# Patient Record
Sex: MALE | Race: WHITE | NOT HISPANIC OR LATINO | Employment: OTHER | URBAN - METROPOLITAN AREA
[De-identification: names, ages, dates, MRNs, and addresses within clinical notes are randomized per-mention and may not be internally consistent; named-entity substitution may affect disease eponyms.]

---

## 2017-08-21 ENCOUNTER — OFFICE VISIT (OUTPATIENT)
Dept: NEUROSURGERY | Facility: CLINIC | Age: 47
End: 2017-08-21

## 2017-08-21 VITALS
DIASTOLIC BLOOD PRESSURE: 68 MMHG | TEMPERATURE: 98.1 F | HEIGHT: 73 IN | SYSTOLIC BLOOD PRESSURE: 132 MMHG | WEIGHT: 227 LBS | BODY MASS INDEX: 30.09 KG/M2

## 2017-08-21 DIAGNOSIS — M25.551 RIGHT HIP PAIN: Primary | ICD-10-CM

## 2017-08-21 PROCEDURE — 99203 OFFICE O/P NEW LOW 30 MIN: CPT | Performed by: NEUROLOGICAL SURGERY

## 2017-08-21 NOTE — PROGRESS NOTES
Subjective     Chief Complaint: Right hip pain    Patient ID: Kong Murphy is a 46 y.o. male seen for consultation today at the request of  Laura Gambino MD    Back Pain   This is a chronic problem. The current episode started more than 1 month ago. The problem occurs every several days. The problem has been waxing and waning since onset. The pain is present in the gluteal. The pain is at a severity of 8/10. The pain is severe. The pain is worse during the night. The symptoms are aggravated by position, sitting and standing. Stiffness is present in the morning. Associated symptoms include leg pain. Pertinent negatives include no abdominal pain, bladder incontinence, bowel incontinence, chest pain, dysuria, fever, headaches, numbness, paresis, paresthesias, pelvic pain, perianal numbness, tingling, weakness or weight loss. He has tried NSAIDs, home exercises, heat, chiropractic manipulation and analgesics for the symptoms. The treatment provided mild relief.       This is a 46-year-old man who presents to my clinic with a chronic history of right hip and leg pain.  He denies back pain.  He has tried physical therapy in the past for his hip pain and states that it made his pain worse.  He takes ibuprofen on a daily basis, and this does help temporarily alleviating his pain.  He denies any back pain.  He endorses radiating pain down his right leg which is worse at night.    The following portions of the patient's history were reviewed and updated as appropriate: allergies, current medications, past family history, past medical history, past social history, past surgical history and problem list.    Family history: History reviewed. No pertinent family history.    Social history:   Social History     Social History   • Marital status:      Spouse name: N/A   • Number of children: N/A   • Years of education: N/A     Occupational History   • Not on file.     Social History Main Topics   • Smoking status:  Never Smoker   • Smokeless tobacco: Not on file   • Alcohol use Yes      Comment: Rarely   • Drug use: No   • Sexual activity: Defer     Other Topics Concern   • Not on file     Social History Narrative   • No narrative on file       Review of Systems   Constitutional: Negative for activity change, appetite change, chills, diaphoresis, fatigue, fever, unexpected weight change and weight loss.   HENT: Negative for congestion, dental problem, drooling, ear discharge, ear pain, facial swelling, hearing loss, mouth sores, nosebleeds, postnasal drip, rhinorrhea, sinus pressure, sneezing, sore throat, tinnitus, trouble swallowing and voice change.    Eyes: Negative for photophobia, pain, discharge, redness, itching and visual disturbance.   Respiratory: Negative for apnea, cough, choking, chest tightness, shortness of breath, wheezing and stridor.    Cardiovascular: Negative for chest pain, palpitations and leg swelling.   Gastrointestinal: Negative for abdominal distention, abdominal pain, anal bleeding, blood in stool, bowel incontinence, constipation, diarrhea, nausea, rectal pain and vomiting.   Endocrine: Negative for cold intolerance, heat intolerance, polydipsia, polyphagia and polyuria.   Genitourinary: Negative for bladder incontinence, decreased urine volume, difficulty urinating, dysuria, enuresis, flank pain, frequency, genital sores, hematuria, pelvic pain and urgency.   Musculoskeletal: Positive for arthralgias and back pain. Negative for gait problem, joint swelling, myalgias, neck pain and neck stiffness.   Skin: Negative for color change, pallor, rash and wound.   Allergic/Immunologic: Negative for environmental allergies, food allergies and immunocompromised state.   Neurological: Negative for dizziness, tingling, tremors, seizures, syncope, facial asymmetry, speech difficulty, weakness, light-headedness, numbness, headaches and paresthesias.   Hematological: Negative for adenopathy. Does not  "bruise/bleed easily.   Psychiatric/Behavioral: Negative for agitation, behavioral problems, confusion, decreased concentration, dysphoric mood, hallucinations, self-injury, sleep disturbance and suicidal ideas. The patient is not nervous/anxious and is not hyperactive.        Objective   Blood pressure 132/68, temperature 98.1 °F (36.7 °C), temperature source Temporal Artery , height 73\" (185.4 cm), weight 227 lb (103 kg).  Body mass index is 29.95 kg/(m^2).    Physical Exam   Constitutional: He is oriented to person, place, and time. He appears well-developed.  Non-toxic appearance.   HENT:   Head: Normocephalic and atraumatic.   Right Ear: Hearing normal.   Left Ear: Hearing normal.   Nose: Nose normal.   Eyes: Conjunctivae, EOM and lids are normal. Pupils are equal, round, and reactive to light.   Neck: Normal range of motion. No JVD present.   Cardiovascular: Normal rate and regular rhythm.    Pulses:       Radial pulses are 2+ on the right side, and 2+ on the left side.   Pulmonary/Chest: Effort normal. No stridor. No respiratory distress. He has no wheezes.   Musculoskeletal:        Right hip: He exhibits decreased range of motion.        Thoracic back: He exhibits no tenderness, no swelling, no pain and no spasm.        Lumbar back: He exhibits no tenderness, no bony tenderness, no swelling, no pain and no spasm.   Muscle Group    L          R  Hip Flexor          5          5  Knee Extensor  5          5  ADF                   5          5  APF                   5          5  EHL                   5          5    Decreased range of motion on internal and external rotation of the right hip.  Pain with external rotation of the right hip.   Neurological: He is alert and oriented to person, place, and time. He has normal strength and normal reflexes. He displays normal reflexes. No cranial nerve deficit or sensory deficit. He exhibits normal muscle tone. He displays a negative Romberg sign. Coordination and gait " normal. GCS eye subscore is 4. GCS verbal subscore is 5. GCS motor subscore is 6.   Reflex Scores:       Tricep reflexes are 2+ on the right side and 2+ on the left side.       Bicep reflexes are 2+ on the right side and 2+ on the left side.       Brachioradialis reflexes are 2+ on the right side and 2+ on the left side.       Patellar reflexes are 2+ on the right side and 2+ on the left side.       Achilles reflexes are 2+ on the right side and 2+ on the left side.  Negative straight leg raise test bilaterally.  Tight hamstrings.   Skin: Skin is warm and dry. No rash noted. No erythema.   Psychiatric: He has a normal mood and affect. His behavior is normal. Judgment and thought content normal.   Nursing note and vitals reviewed.        Assessment/Plan     Independent Review of Radiographic Studies:      Available for my review is a MRI of the lumbar spine was performed on 8/4/17.  This discloses moderate degenerative disc disease at L5-S1.  There is a moderately sized, extruded disc fragment which is eccentric to the left side causing severe lateral recess stenosis, and presumably impingement of the descending S1 nerve root.  There is no appreciable intraforaminal stenosis on the side.  There is mild loss of lumbar lordosis.  There is mild, diffuse facet arthropathy present throughout the lumbar spine.  There are no other focal areas of lateral recess or neural foraminal stenosis on this study.    Medical Decision Making:      This is a 46-year-old man with right hip and leg pain.  He has no clear MRI correlate on the MRI of his lumbar spine to explain his symptoms.  He has a negative straight leg raise test.  He has impaired internal and neck rotation of his right hip as well as tight hamstrings bilaterally.  My assumption is that his pain is coming from his hip.  I made a referral to orthopedic surgery and ordered a MRI of his hip.  He can follow-up with me on an as-needed basis for low back pain.    Kong was  seen today for leg pain.    Diagnoses and all orders for this visit:    Right hip pain  -     MRI hip right wo contrast; Future  -     Ambulatory Referral to Orthopedic Surgery  -     Ambulatory Referral to Pain Management    Other orders  -     SCANNED - IMAGING        Return if symptoms worsen or fail to improve.           This document signed by SAVANAH Thomas MD August 21, 2017 10:43 AM

## 2017-08-23 ENCOUNTER — TELEPHONE (OUTPATIENT)
Dept: PAIN MEDICINE | Facility: CLINIC | Age: 47
End: 2017-08-23

## 2017-09-19 ENCOUNTER — OFFICE VISIT (OUTPATIENT)
Dept: ORTHOPEDIC SURGERY | Facility: CLINIC | Age: 47
End: 2017-09-19

## 2017-09-19 VITALS
HEIGHT: 73 IN | BODY MASS INDEX: 29.55 KG/M2 | WEIGHT: 223 LBS | HEART RATE: 87 BPM | DIASTOLIC BLOOD PRESSURE: 86 MMHG | SYSTOLIC BLOOD PRESSURE: 160 MMHG

## 2017-09-19 DIAGNOSIS — M16.11 PRIMARY OSTEOARTHRITIS OF RIGHT HIP: Primary | ICD-10-CM

## 2017-09-19 DIAGNOSIS — R52 PAIN: ICD-10-CM

## 2017-09-19 PROCEDURE — 99244 OFF/OP CNSLTJ NEW/EST MOD 40: CPT | Performed by: ORTHOPAEDIC SURGERY

## 2017-09-19 RX ORDER — MELOXICAM 15 MG/1
TABLET ORAL
Qty: 90 TABLET | Refills: 3 | Status: SHIPPED | OUTPATIENT
Start: 2017-09-19 | End: 2022-08-30

## 2017-09-19 NOTE — PROGRESS NOTES
Orthopaedic Clinic Note: Hip New Patient    Chief Complaint   Patient presents with   • Right Hip - Pain        HPI    Consult from Pako Thomas M.D.    Trevin Murphy is a 47 y.o. male who presents with right hip pain for 1 year(s). Onset has been atraumatic and gradual in nature.  He describes the pain as being localized to the groin and lateral aspect of the hip and radiating down the leg.  He rates the pain a 4/10 on the pain scale at best and a 10/10 on the pain scale at worst.  He has previously been seen and evaluated by neurosurgeon to evaluate for potential lumbar spine pathology.  That workup to date has been negative.  He is undergone physical therapy and has been taking occasional anti-inflammatories without significant relief.  He states his pain is worse with ambulating, twisting, kneeling, flexing and abducting the hip.  Rest and sitting sometimes improve the pain.  He is ambulating with no assistive device.  Although some transient relief was reported with these interventions, these conservative measures have failed and symptoms have persisted. The patient is limited in daily activities and has had a significant decrease in quality of life as a result. He denies fevers, chills, or constitutional symptoms      Past Medical History:   Diagnosis Date   • Kidney stones    • Leg pain     Right leg   • Osteoarthritis       Past Surgical History:   Procedure Laterality Date   • CHOLECYSTECTOMY  2015   • VASECTOMY  2002      Family History   Problem Relation Age of Onset   • Cancer Mother    • Heart attack Brother    • Diabetes Other      Social History     Social History   • Marital status:      Spouse name: N/A   • Number of children: N/A   • Years of education: N/A     Occupational History   • Not on file.     Social History Main Topics   • Smoking status: Never Smoker   • Smokeless tobacco: Never Used   • Alcohol use Yes      Comment: Rarely   • Drug use: No   • Sexual activity: Defer     Other  "Topics Concern   • Not on file     Social History Narrative      No current outpatient prescriptions on file prior to visit.     No current facility-administered medications on file prior to visit.       No Known Allergies     Review of Systems   Constitutional: Negative.    HENT: Negative.    Eyes: Negative.    Respiratory: Negative.    Cardiovascular: Negative.    Gastrointestinal: Negative.    Endocrine: Negative.    Genitourinary: Negative.    Musculoskeletal: Positive for arthralgias.   Skin: Negative.    Allergic/Immunologic: Negative.    Neurological: Negative.    Hematological: Negative.    Psychiatric/Behavioral: Negative.         The following portions of the patient's history were reviewed and updated as appropriate: allergies, current medications, past family history, past medical history, past social history, past surgical history and problem list.    Physical Exam  Blood pressure 160/86, pulse 87, height 73\" (185.4 cm), weight 223 lb (101 kg).    Body mass index is 29.42 kg/(m^2).    GENERAL APPEARANCE: awake, alert & oriented x 3, in no acute distress and well developed, well nourished  PSYCH: normal affect  LUNGS:  breathing nonlabored  EYES: sclera anicteric  CARDIOVASCULAR: palpable dorsalis pedis, palpable posterior tibial bilaterally. Capillary refill less than 2 seconds  EXTREMITIES: no clubbing, cyanosis  GAIT:  Normal           Right Hip Exam:  RANGE OF MOTION:   FLEXION CONTRACTURE: None  FLEXION: 110 degrees  INTERNAL ROTATION: 10 degrees at 90 degrees of flexion   EXTERNAL ROTATION: 30 degrees at 90 degrees of flexion    PAIN WITH HIP MOTION: yes, pain with terminal internal rotation  PAIN WITH LOGROLL: no  STINCHFIELD TEST: negative    KNEE EXAM: full knee ROM (0-130), stable to varus/valgus stress at terminal extension and 30 degrees     STRENGTH:  5/5 hip adduction, abduction, flexion. 5/5 knee flexion, extension. 5/5 ankle dorsiflexion and plantarflexion.     GREATER TROCHANTER BURSAL " PAIN:  no     REFLEXES:   PATELLAR 2+/4   ACHILLES 2+/4    CLONUS: no  STRAIGHT LEG TEST:   negative    SENSATION TO LIGHT TOUCH:  DEEP PERONEAL/SUPERFICIAL PERONEAL/SURAL/SAPHENOUS/TIBIAL:  intact    EDEMA:   no  ERYTHEMA:  no  WOUNDS/INCISIONS: no        Left Hip Exam:   RANGE OF MOTION:   FLEXION CONTRACTURE: None   FLEXION: 110 degrees   INTERNAL ROTATION: 10 degrees at 90 degrees of flexion   EXTERNAL ROTATION: 40 degrees at 90 degrees of flexion    PAIN WITH HIP MOTION: no  PAIN WITH LOGROLL: no  STINCHFIELD TEST: negative    KNEE EXAM: full knee ROM (0-130), stable to varus/valgus stress at terminal extension and 30 degrees     STRENGTH:  5/5 hip adduction, abduction, flexion. 5/5 strength knee flexion, extension. 5/5 strength ankle dorsiflexion and plantarflexion.     GREATER TROCHANTER BURSAL PAIN:  no     REFLEXES:   PATELLAR 2+/4   ACHILLES 2+/4    CLONUS: negative  STRAIGHT LEG TEST:   negative    SENSATION TO LIGHT TOUCH:  DEEP PERONEAL/SUPERFICIAL PERONEAL/SURAL/SAPHENOUS/TIBIAL:  intact    EDEMA:   no  ERYTHEMA:  no  WOUNDS/INCISIONS: none, no overlying skin problems.      ------------------------------------------------------------------    LEG LENGTHS:  equal  _____________________________________________________  _____________________________________________________    RADIOGRAPHIC FINDINGS:   Indication: Left hip pain     Comparison: No prior xrays are available for comparison    AP pelvis, hip 2 views: Right: Demonstrates evidence of mild to moderate joint space narrowing with cam deformity of the right hip.  Discussed patient superolateral labrum as well as marginal osteophyte formation at the inferior medial margin of the acetabulum and femoral head neck junction.; Left: Demonstrates similar cam deformity with moderate joint space narrowing.  Calcification of the labrum is not visualized.    Assessment/Plan:   Diagnosis Plan   1. Primary osteoarthritis of right hip  meloxicam (MOBIC) 15 MG  tablet    FL Injection Hip Right   2. Pain  XR Hip With or Without Pelvis 1 View Right     I discussed with patient that his radiographic findings appear to be concerning for hip arthritis.  Combining this with his clinical exam, I'm somewhat concerned about intra-articular hip pathology, namely osteoarthritis with cam deformity, being the source of his hip pain.  His pain distribution is somewhat unusual and therefore I recommended a intra-articular hip injection in order to determine if this is in fact the source of his pain.  I explained that this would be both a diagnostic and therapeutic measure.  He was agreeable with this plan.  In addition to this I'll prescribe him meloxicam.  I will see him back in 3 months for repeat evaluation.    Philip Chowdhury MD  09/19/17  3:22 PM

## 2017-09-27 ENCOUNTER — HOSPITAL ENCOUNTER (OUTPATIENT)
Dept: GENERAL RADIOLOGY | Facility: HOSPITAL | Age: 47
Discharge: HOME | End: 2017-09-27
Attending: ORTHOPAEDIC SURGERY | Admitting: ORTHOPAEDIC SURGERY

## 2017-09-27 DIAGNOSIS — M16.11 PRIMARY OSTEOARTHRITIS OF RIGHT HIP: ICD-10-CM

## 2017-09-27 PROCEDURE — 25010000002 TRIAMCINOLONE PER 10 MG: Performed by: ORTHOPAEDIC SURGERY

## 2017-09-27 PROCEDURE — 77002 NEEDLE LOCALIZATION BY XRAY: CPT

## 2017-09-27 PROCEDURE — 0 IOPAMIDOL 61 % SOLUTION: Performed by: ORTHOPAEDIC SURGERY

## 2017-09-27 RX ORDER — LIDOCAINE HYDROCHLORIDE 10 MG/ML
10 INJECTION, SOLUTION INFILTRATION; PERINEURAL ONCE
Status: DISCONTINUED | OUTPATIENT
Start: 2017-09-27 | End: 2017-09-27

## 2017-09-27 RX ORDER — TRIAMCINOLONE ACETONIDE 40 MG/ML
80 INJECTION, SUSPENSION INTRA-ARTICULAR; INTRAMUSCULAR ONCE
Status: COMPLETED | OUTPATIENT
Start: 2017-09-27 | End: 2017-09-27

## 2017-09-27 RX ORDER — BUPIVACAINE HYDROCHLORIDE 2.5 MG/ML
3 INJECTION, SOLUTION EPIDURAL; INFILTRATION; INTRACAUDAL ONCE
Status: COMPLETED | OUTPATIENT
Start: 2017-09-27 | End: 2017-09-27

## 2017-09-27 RX ORDER — LIDOCAINE HYDROCHLORIDE 10 MG/ML
3 INJECTION, SOLUTION EPIDURAL; INFILTRATION; INTRACAUDAL; PERINEURAL ONCE
Status: COMPLETED | OUTPATIENT
Start: 2017-09-27 | End: 2017-09-27

## 2017-09-27 RX ADMIN — TRIAMCINOLONE ACETONIDE 80 MG: 40 INJECTION, SUSPENSION INTRA-ARTICULAR; INTRAMUSCULAR at 09:01

## 2017-09-27 RX ADMIN — IOPAMIDOL 10 ML: 612 INJECTION, SOLUTION INTRAVENOUS at 09:00

## 2017-09-27 RX ADMIN — LIDOCAINE HYDROCHLORIDE 3 ML: 10 INJECTION, SOLUTION EPIDURAL; INFILTRATION; INTRACAUDAL; PERINEURAL at 09:01

## 2017-09-27 RX ADMIN — LIDOCAINE HYDROCHLORIDE 10 ML: 10 INJECTION, SOLUTION INFILTRATION; PERINEURAL at 09:00

## 2017-09-27 RX ADMIN — BUPIVACAINE HYDROCHLORIDE 3 ML: 2.5 INJECTION, SOLUTION EPIDURAL; INFILTRATION; INTRACAUDAL; PERINEURAL at 09:01

## 2019-08-15 ENCOUNTER — HOSPITAL ENCOUNTER (OUTPATIENT)
Dept: GENERAL RADIOLOGY | Facility: HOSPITAL | Age: 49
Discharge: HOME | End: 2019-08-15
Admitting: NURSE PRACTITIONER

## 2019-08-15 ENCOUNTER — TRANSCRIBE ORDERS (OUTPATIENT)
Dept: ADMINISTRATIVE | Facility: HOSPITAL | Age: 49
End: 2019-08-15

## 2019-08-15 DIAGNOSIS — M25.562 LEFT KNEE PAIN, UNSPECIFIED CHRONICITY: Primary | ICD-10-CM

## 2019-08-15 PROCEDURE — 73562 X-RAY EXAM OF KNEE 3: CPT

## 2022-08-30 ENCOUNTER — OFFICE VISIT (OUTPATIENT)
Dept: ORTHOPEDIC SURGERY | Facility: CLINIC | Age: 52
End: 2022-08-30

## 2022-08-30 VITALS
BODY MASS INDEX: 29.29 KG/M2 | SYSTOLIC BLOOD PRESSURE: 138 MMHG | WEIGHT: 221 LBS | HEIGHT: 73 IN | DIASTOLIC BLOOD PRESSURE: 85 MMHG

## 2022-08-30 DIAGNOSIS — M23.204 OLD COMPLEX TEAR OF MEDIAL MENISCUS OF LEFT KNEE: ICD-10-CM

## 2022-08-30 DIAGNOSIS — M17.12 PRIMARY OSTEOARTHRITIS OF LEFT KNEE: Primary | ICD-10-CM

## 2022-08-30 PROCEDURE — 99204 OFFICE O/P NEW MOD 45 MIN: CPT | Performed by: ORTHOPAEDIC SURGERY

## 2022-08-30 RX ORDER — IBUPROFEN 200 MG
200 TABLET ORAL AS NEEDED
COMMUNITY
End: 2022-08-30 | Stop reason: ALTCHOICE

## 2022-08-30 RX ORDER — MELOXICAM 15 MG/1
TABLET ORAL
Qty: 90 TABLET | Refills: 1 | Status: SHIPPED | OUTPATIENT
Start: 2022-08-30

## 2022-08-30 RX ORDER — SERTRALINE HYDROCHLORIDE 100 MG/1
200 TABLET, FILM COATED ORAL DAILY
COMMUNITY

## 2022-08-30 NOTE — PROGRESS NOTES
Orthopaedic Clinic Note: Knee New Patient    Chief Complaint   Patient presents with   • Left Knee - Pain        HPI    Kong Murphy is a 52 y.o. male who presents with left knee pain for 3 year(s). Onset atraumatic and gradual in nature. Pain is localized to the anterior knee and is a 5/10 on the pain scale. Pain is described as aching. Associated symptoms include giving way/buckling. The pain is worse with any movement of the joint; resting make it better. Previous treatments have included: NSAIDS since symptom onset. Although some transient relief was reported with these interventions, these conservative measures have failed and symptoms have persisted. The patient is limited in daily activities and has had a significant decrease in quality of life as a result. He denies fevers, chills, or constitutional symptoms.    I have reviewed the following portions of the patient's history:History of Present Illness    Past Medical History:   Diagnosis Date   • Kidney stones    • Leg pain     Right leg   • Osteoarthritis       Past Surgical History:   Procedure Laterality Date   • CHOLECYSTECTOMY  2015   • VASECTOMY  2002      Family History   Problem Relation Age of Onset   • Cancer Mother    • Heart attack Brother    • Diabetes Other      Social History     Socioeconomic History   • Marital status:    Tobacco Use   • Smoking status: Never Smoker   • Smokeless tobacco: Never Used   Substance and Sexual Activity   • Alcohol use: Yes     Comment: Rarely   • Drug use: No   • Sexual activity: Defer      Current Outpatient Medications on File Prior to Visit   Medication Sig Dispense Refill   • sertraline (ZOLOFT) 100 MG tablet Take 200 mg by mouth Daily.     • [DISCONTINUED] ibuprofen (Advil) 200 MG tablet Take 200 mg by mouth As Needed for Mild Pain.     • [DISCONTINUED] meloxicam (MOBIC) 15 MG tablet 1 PO Daily with food. 90 tablet 3     No current facility-administered medications on file prior to visit.      No  "Known Allergies     Review of Systems   Constitutional: Negative.    HENT: Negative.    Eyes: Negative.    Respiratory: Negative.    Cardiovascular: Negative.    Gastrointestinal: Negative.    Endocrine: Negative.    Genitourinary: Negative.    Musculoskeletal: Positive for arthralgias.   Skin: Negative.    Allergic/Immunologic: Negative.    Neurological: Negative.    Hematological: Negative.    Psychiatric/Behavioral: Negative.         The patient's Review of Systems was personally reviewed and confirmed as accurate.    The following portions of the patient's history were reviewed and updated as appropriate: allergies, current medications, past family history, past medical history, past social history, past surgical history and problem list.    Physical Exam  Blood pressure 138/85, height 185.4 cm (72.99\"), weight 100 kg (221 lb).    Body mass index is 29.16 kg/m².    GENERAL APPEARANCE: awake, alert & oriented x 3, in no acute distress and well developed, well nourished  PSYCH: normal affect  LUNGS:  breathing nonlabored  EYES: sclera anicteric  CARDIOVASCULAR: palpable dorsalis pedis, palpable posterior tibial bilaterally. Capillary refill less than 2 seconds  EXTREMITIES: no clubbing, cyanosis  GAIT:  Normal            Right Lower Extremity Exam:   ----------  Hip Exam  ----------  FLEXION CONTRACTURE: None  FLEXION: 110 degrees  INTERNAL ROTATION: 20 degrees at 90 degrees of flexion   EXTERNAL ROTATION: 40 degrees at 90 degrees of flexion    PAIN WITH HIP MOTION: no  ----------  Knee Exam  ----------  ALIGNMENT: neutral, no varus or valgus deformity     RANGE OF MOTION:  Normal (0-120 degrees) with no extensor lag or flexion contracture  LIGAMENTOUS STABILITY:   stable to varus and valgus stress at terminal extension and 30 degrees without any evidence of laxity     STRENGTH:  5/5 knee flexion, extension. 5/5 ankle dorsiflexion and plantarflexion.     PAIN WITH PALPATION: denies tenderness to palpation about " the knee, denies medial or lateral joint line pain  KNEE EFFUSION: no  PAIN WITH KNEE ROM: no  PATELLAR CREPITUS: yes, asymptomatic  SPECIAL EXAM FINDINGS:  Negative patellar compression    REFLEXES:  PATELLAR 2+/4  ACHILLES 2+/4    CLONUS: negative  STRAIGHT LEG TEST:   negative    SENSATION TO LIGHT TOUCH:  DEEP PERONEAL/SUPERFICIAL PERONEAL/SURAL/SAPHENOUS/TIBIAL:   intact    EDEMA:  no  ERYTHEMA:  no  WOUNDS/INCISIONS: no overlying skin problems.      Left Lower Extremity Exam:   ----------  Hip Exam  ----------  FLEXION CONTRACTURE: None  FLEXION: 110 degrees  INTERNAL ROTATION: 20 degrees at 90 degrees of flexion   EXTERNAL ROTATION: 40 degrees at 90 degrees of flexion    PAIN WITH HIP MOTION: no  ----------  Knee Exam  ----------  ALIGNMENT: neutral, no varus or valgus deformity     RANGE OF MOTION:  Normal (0-120 degrees) with no extensor lag or flexion contracture  LIGAMENTOUS STABILITY:   stable to varus and valgus stress at terminal extension and 30 degrees without any evidence of laxity     STRENGTH:  5/5 knee flexion, extension. 5/5 ankle dorsiflexion and plantarflexion.     PAIN WITH PALPATION: denies tenderness to palpation about the knee, denies medial or lateral joint line pain  KNEE EFFUSION: no  PAIN WITH KNEE ROM: no  PATELLAR CREPITUS: yes, asymptomatic  SPECIAL EXAM FINDINGS:  Negative patellar compression    REFLEXES:  PATELLAR 2+/4  ACHILLES 2+/4    CLONUS: negative  STRAIGHT LEG TEST:   negative    SENSATION TO LIGHT TOUCH:  DEEP PERONEAL/SUPERFICIAL PERONEAL/SURAL/SAPHENOUS/TIBIAL:   intact    EDEMA:  no  ERYTHEMA:  no  WOUNDS/INCISIONS: no overlying skin problems.    ______________________________________________________________________  ______________________________________________________________________    RADIOGRAPHIC FINDINGS:   Left knee radiographs and MRI from 8/17/2022 and 8/23/2022 were personally interpreted.  Imaging reveals mild to moderate tricompartmental arthritis with no  acute bony injury or fracture.  MRI reveals medial meniscal tear with osteochondral changes of involving the medial compartment of the knee as well as patellofemoral compartment.  There is complex tear of the posterior horn of the medial meniscus.  Arthritic changes primarily involving patellofemoral and medial compartments of the knee.    Assessment/Plan:   Diagnosis Plan   1. Primary osteoarthritis of left knee  meloxicam (MOBIC) 15 MG tablet   2. Old complex tear of medial meniscus of left knee       I reviewed the MRI findings with the patient.  Clinically, his exam does not demonstrate pain to the medial joint line indicating his meniscal tear as being the source of his symptoms.  He is having more diffuse achiness and swelling that appears to be due to more arthritic changes.  As a result, discussed proceeding with treatment for his arthritis.  He is agreeable to prescription anti-inflammatory.  Meloxicam prescribed.  He will follow-up in 6 weeks for repeat assessment.    Philip Chodwhury MD  08/30/22  14:30 EDT

## 2023-10-30 ENCOUNTER — HOSPITAL ENCOUNTER (INPATIENT)
Facility: HOSPITAL | Age: 53
LOS: 2 days | Discharge: HOME | End: 2023-11-01
Attending: EMERGENCY MEDICINE | Admitting: INTERNAL MEDICINE
Payer: COMMERCIAL

## 2023-10-30 ENCOUNTER — APPOINTMENT (OUTPATIENT)
Dept: GENERAL RADIOLOGY | Facility: HOSPITAL | Age: 53
End: 2023-10-30
Payer: COMMERCIAL

## 2023-10-30 DIAGNOSIS — I21.4 NSTEMI (NON-ST ELEVATED MYOCARDIAL INFARCTION): Primary | ICD-10-CM

## 2023-10-30 DIAGNOSIS — E11.9 NEW ONSET TYPE 2 DIABETES MELLITUS: ICD-10-CM

## 2023-10-30 PROBLEM — F41.9 ANXIETY: Status: ACTIVE | Noted: 2023-10-30

## 2023-10-30 PROBLEM — R79.89 ELEVATED TROPONIN: Status: ACTIVE | Noted: 2023-10-30

## 2023-10-30 PROBLEM — R07.9 CHEST PAIN: Status: ACTIVE | Noted: 2023-10-30

## 2023-10-30 LAB
ALBUMIN SERPL-MCNC: 4.3 G/DL (ref 3.5–5.2)
ALBUMIN/GLOB SERPL: 1.3 G/DL
ALP SERPL-CCNC: 82 U/L (ref 39–117)
ALT SERPL W P-5'-P-CCNC: 47 U/L (ref 1–41)
ANION GAP SERPL CALCULATED.3IONS-SCNC: 12 MMOL/L (ref 5–15)
APTT PPP: 27 SECONDS (ref 60–90)
AST SERPL-CCNC: 25 U/L (ref 1–40)
BASOPHILS # BLD AUTO: 0.03 10*3/MM3 (ref 0–0.2)
BASOPHILS NFR BLD AUTO: 0.4 % (ref 0–1.5)
BILIRUB SERPL-MCNC: 0.5 MG/DL (ref 0–1.2)
BUN SERPL-MCNC: 15 MG/DL (ref 6–20)
BUN/CREAT SERPL: 13.2 (ref 7–25)
CALCIUM SPEC-SCNC: 9.4 MG/DL (ref 8.6–10.5)
CHLORIDE SERPL-SCNC: 100 MMOL/L (ref 98–107)
CO2 SERPL-SCNC: 22 MMOL/L (ref 22–29)
CREAT SERPL-MCNC: 1.14 MG/DL (ref 0.76–1.27)
DEPRECATED RDW RBC AUTO: 42.7 FL (ref 37–54)
EGFRCR SERPLBLD CKD-EPI 2021: 76 ML/MIN/1.73
EOSINOPHIL # BLD AUTO: 0.07 10*3/MM3 (ref 0–0.4)
EOSINOPHIL NFR BLD AUTO: 1 % (ref 0.3–6.2)
ERYTHROCYTE [DISTWIDTH] IN BLOOD BY AUTOMATED COUNT: 13.2 % (ref 12.3–15.4)
GEN 5 2HR TROPONIN T REFLEX: 49 NG/L
GLOBULIN UR ELPH-MCNC: 3.4 GM/DL
GLUCOSE BLDC GLUCOMTR-MCNC: 245 MG/DL (ref 70–130)
GLUCOSE SERPL-MCNC: 368 MG/DL (ref 65–99)
HBA1C MFR BLD: 9.3 % (ref 4.8–5.6)
HCT VFR BLD AUTO: 45.9 % (ref 37.5–51)
HGB BLD-MCNC: 15.9 G/DL (ref 13–17.7)
HOLD SPECIMEN: NORMAL
IMM GRANULOCYTES # BLD AUTO: 0.02 10*3/MM3 (ref 0–0.05)
IMM GRANULOCYTES NFR BLD AUTO: 0.3 % (ref 0–0.5)
INR PPP: 1 (ref 0.89–1.12)
LIPASE SERPL-CCNC: 54 U/L (ref 13–60)
LYMPHOCYTES # BLD AUTO: 1.82 10*3/MM3 (ref 0.7–3.1)
LYMPHOCYTES NFR BLD AUTO: 26.1 % (ref 19.6–45.3)
MCH RBC QN AUTO: 30.9 PG (ref 26.6–33)
MCHC RBC AUTO-ENTMCNC: 34.6 G/DL (ref 31.5–35.7)
MCV RBC AUTO: 89.3 FL (ref 79–97)
MONOCYTES # BLD AUTO: 0.71 10*3/MM3 (ref 0.1–0.9)
MONOCYTES NFR BLD AUTO: 10.2 % (ref 5–12)
NEUTROPHILS NFR BLD AUTO: 4.33 10*3/MM3 (ref 1.7–7)
NEUTROPHILS NFR BLD AUTO: 62 % (ref 42.7–76)
NRBC BLD AUTO-RTO: 0 /100 WBC (ref 0–0.2)
NT-PROBNP SERPL-MCNC: <36 PG/ML (ref 0–900)
PLATELET # BLD AUTO: 160 10*3/MM3 (ref 140–450)
PMV BLD AUTO: 11.9 FL (ref 6–12)
POTASSIUM SERPL-SCNC: 4.2 MMOL/L (ref 3.5–5.2)
PROT SERPL-MCNC: 7.7 G/DL (ref 6–8.5)
PROTHROMBIN TIME: 13.3 SECONDS (ref 12.2–14.5)
RBC # BLD AUTO: 5.14 10*6/MM3 (ref 4.14–5.8)
SODIUM SERPL-SCNC: 134 MMOL/L (ref 136–145)
TROPONIN T DELTA: 38 NG/L
TROPONIN T SERPL HS-MCNC: 11 NG/L
TROPONIN T SERPL HS-MCNC: 189 NG/L
UFH PPP CHRO-ACNC: 1.02 IU/ML (ref 0.3–0.7)
WBC NRBC COR # BLD: 6.98 10*3/MM3 (ref 3.4–10.8)
WHOLE BLOOD HOLD COAG: NORMAL
WHOLE BLOOD HOLD SPECIMEN: NORMAL

## 2023-10-30 PROCEDURE — 83690 ASSAY OF LIPASE: CPT

## 2023-10-30 PROCEDURE — 25010000002 HEPARIN (PORCINE) 25000-0.45 UT/250ML-% SOLUTION: Performed by: PHYSICIAN ASSISTANT

## 2023-10-30 PROCEDURE — 85025 COMPLETE CBC W/AUTO DIFF WBC: CPT

## 2023-10-30 PROCEDURE — 99285 EMERGENCY DEPT VISIT HI MDM: CPT

## 2023-10-30 PROCEDURE — 25810000003 SODIUM CHLORIDE 0.9 % SOLUTION: Performed by: NURSE PRACTITIONER

## 2023-10-30 PROCEDURE — 80053 COMPREHEN METABOLIC PANEL: CPT

## 2023-10-30 PROCEDURE — 83880 ASSAY OF NATRIURETIC PEPTIDE: CPT

## 2023-10-30 PROCEDURE — 85730 THROMBOPLASTIN TIME PARTIAL: CPT | Performed by: PHYSICIAN ASSISTANT

## 2023-10-30 PROCEDURE — G0378 HOSPITAL OBSERVATION PER HR: HCPCS

## 2023-10-30 PROCEDURE — 93010 ELECTROCARDIOGRAM REPORT: CPT | Performed by: INTERNAL MEDICINE

## 2023-10-30 PROCEDURE — 25010000002 HEPARIN (PORCINE) PER 1000 UNITS: Performed by: PHYSICIAN ASSISTANT

## 2023-10-30 PROCEDURE — 93005 ELECTROCARDIOGRAM TRACING: CPT

## 2023-10-30 PROCEDURE — 84484 ASSAY OF TROPONIN QUANT: CPT | Performed by: NURSE PRACTITIONER

## 2023-10-30 PROCEDURE — 82948 REAGENT STRIP/BLOOD GLUCOSE: CPT

## 2023-10-30 PROCEDURE — 71045 X-RAY EXAM CHEST 1 VIEW: CPT

## 2023-10-30 PROCEDURE — 25810000003 SODIUM CHLORIDE 0.9 % SOLUTION: Performed by: PHYSICIAN ASSISTANT

## 2023-10-30 PROCEDURE — 63710000001 INSULIN LISPRO (HUMAN) PER 5 UNITS: Performed by: INTERNAL MEDICINE

## 2023-10-30 PROCEDURE — 63710000001 INSULIN LISPRO (HUMAN) PER 5 UNITS: Performed by: NURSE PRACTITIONER

## 2023-10-30 PROCEDURE — 93005 ELECTROCARDIOGRAM TRACING: CPT | Performed by: INTERNAL MEDICINE

## 2023-10-30 PROCEDURE — 85610 PROTHROMBIN TIME: CPT | Performed by: PHYSICIAN ASSISTANT

## 2023-10-30 PROCEDURE — 36415 COLL VENOUS BLD VENIPUNCTURE: CPT

## 2023-10-30 PROCEDURE — 25010000002 HEPARIN (PORCINE) 25000-0.45 UT/250ML-% SOLUTION: Performed by: INTERNAL MEDICINE

## 2023-10-30 PROCEDURE — 63710000001 INSULIN REGULAR HUMAN PER 5 UNITS: Performed by: EMERGENCY MEDICINE

## 2023-10-30 PROCEDURE — 85520 HEPARIN ASSAY: CPT | Performed by: PHYSICIAN ASSISTANT

## 2023-10-30 PROCEDURE — 83036 HEMOGLOBIN GLYCOSYLATED A1C: CPT | Performed by: PHYSICIAN ASSISTANT

## 2023-10-30 PROCEDURE — 84484 ASSAY OF TROPONIN QUANT: CPT

## 2023-10-30 RX ORDER — IBUPROFEN 600 MG/1
1 TABLET ORAL
Status: DISCONTINUED | OUTPATIENT
Start: 2023-10-30 | End: 2023-11-01 | Stop reason: HOSPADM

## 2023-10-30 RX ORDER — SERTRALINE HYDROCHLORIDE 100 MG/1
200 TABLET, FILM COATED ORAL DAILY
Status: DISCONTINUED | OUTPATIENT
Start: 2023-10-31 | End: 2023-11-01 | Stop reason: HOSPADM

## 2023-10-30 RX ORDER — SODIUM CHLORIDE 9 MG/ML
40 INJECTION, SOLUTION INTRAVENOUS AS NEEDED
Status: DISCONTINUED | OUTPATIENT
Start: 2023-10-30 | End: 2023-11-01 | Stop reason: HOSPADM

## 2023-10-30 RX ORDER — HEPARIN SODIUM 10000 [USP'U]/100ML
13 INJECTION, SOLUTION INTRAVENOUS
Status: DISCONTINUED | OUTPATIENT
Start: 2023-10-30 | End: 2023-10-31

## 2023-10-30 RX ORDER — AMOXICILLIN 250 MG
2 CAPSULE ORAL 2 TIMES DAILY
Status: DISCONTINUED | OUTPATIENT
Start: 2023-10-30 | End: 2023-11-01 | Stop reason: HOSPADM

## 2023-10-30 RX ORDER — HEPARIN SODIUM 1000 [USP'U]/ML
4000 INJECTION, SOLUTION INTRAVENOUS; SUBCUTANEOUS ONCE
Qty: 10 ML | Refills: 0 | Status: COMPLETED | OUTPATIENT
Start: 2023-10-30 | End: 2023-10-30

## 2023-10-30 RX ORDER — BISACODYL 10 MG
10 SUPPOSITORY, RECTAL RECTAL DAILY PRN
Status: DISCONTINUED | OUTPATIENT
Start: 2023-10-30 | End: 2023-11-01 | Stop reason: HOSPADM

## 2023-10-30 RX ORDER — BISACODYL 5 MG/1
5 TABLET, DELAYED RELEASE ORAL DAILY PRN
Status: DISCONTINUED | OUTPATIENT
Start: 2023-10-30 | End: 2023-11-01 | Stop reason: HOSPADM

## 2023-10-30 RX ORDER — SODIUM CHLORIDE 0.9 % (FLUSH) 0.9 %
10 SYRINGE (ML) INJECTION AS NEEDED
Status: DISCONTINUED | OUTPATIENT
Start: 2023-10-30 | End: 2023-11-01 | Stop reason: HOSPADM

## 2023-10-30 RX ORDER — DEXTROSE MONOHYDRATE 25 G/50ML
25 INJECTION, SOLUTION INTRAVENOUS
Status: DISCONTINUED | OUTPATIENT
Start: 2023-10-30 | End: 2023-11-01 | Stop reason: HOSPADM

## 2023-10-30 RX ORDER — ONDANSETRON 2 MG/ML
4 INJECTION INTRAMUSCULAR; INTRAVENOUS EVERY 6 HOURS PRN
Status: DISCONTINUED | OUTPATIENT
Start: 2023-10-30 | End: 2023-11-01 | Stop reason: HOSPADM

## 2023-10-30 RX ORDER — POLYETHYLENE GLYCOL 3350 17 G/17G
17 POWDER, FOR SOLUTION ORAL DAILY PRN
Status: DISCONTINUED | OUTPATIENT
Start: 2023-10-30 | End: 2023-11-01 | Stop reason: HOSPADM

## 2023-10-30 RX ORDER — HEPARIN SODIUM 1000 [USP'U]/ML
4000 INJECTION, SOLUTION INTRAVENOUS; SUBCUTANEOUS AS NEEDED
Status: DISCONTINUED | OUTPATIENT
Start: 2023-10-30 | End: 2023-10-30

## 2023-10-30 RX ORDER — NITROGLYCERIN 0.4 MG/1
0.4 TABLET SUBLINGUAL
Status: DISCONTINUED | OUTPATIENT
Start: 2023-10-30 | End: 2023-10-30

## 2023-10-30 RX ORDER — SODIUM CHLORIDE 9 MG/ML
75 INJECTION, SOLUTION INTRAVENOUS CONTINUOUS
Status: ACTIVE | OUTPATIENT
Start: 2023-10-30 | End: 2023-10-31

## 2023-10-30 RX ORDER — ONDANSETRON 4 MG/1
4 TABLET, FILM COATED ORAL EVERY 6 HOURS PRN
Status: DISCONTINUED | OUTPATIENT
Start: 2023-10-30 | End: 2023-11-01 | Stop reason: HOSPADM

## 2023-10-30 RX ORDER — SODIUM CHLORIDE 0.9 % (FLUSH) 0.9 %
10 SYRINGE (ML) INJECTION EVERY 12 HOURS SCHEDULED
Status: DISCONTINUED | OUTPATIENT
Start: 2023-10-30 | End: 2023-11-01 | Stop reason: HOSPADM

## 2023-10-30 RX ORDER — NICOTINE POLACRILEX 4 MG
15 LOZENGE BUCCAL
Status: DISCONTINUED | OUTPATIENT
Start: 2023-10-30 | End: 2023-11-01 | Stop reason: HOSPADM

## 2023-10-30 RX ORDER — INSULIN LISPRO 100 [IU]/ML
2-7 INJECTION, SOLUTION INTRAVENOUS; SUBCUTANEOUS
Status: DISCONTINUED | OUTPATIENT
Start: 2023-10-30 | End: 2023-11-01 | Stop reason: HOSPADM

## 2023-10-30 RX ORDER — HEPARIN SODIUM 1000 [USP'U]/ML
2000 INJECTION, SOLUTION INTRAVENOUS; SUBCUTANEOUS AS NEEDED
Status: DISCONTINUED | OUTPATIENT
Start: 2023-10-30 | End: 2023-10-30

## 2023-10-30 RX ORDER — NIFEDIPINE 10 MG/1
20 CAPSULE ORAL EVERY 6 HOURS SCHEDULED
Status: DISCONTINUED | OUTPATIENT
Start: 2023-10-31 | End: 2023-10-31

## 2023-10-30 RX ORDER — ASPIRIN 81 MG/1
324 TABLET, CHEWABLE ORAL ONCE
Status: COMPLETED | OUTPATIENT
Start: 2023-10-30 | End: 2023-10-30

## 2023-10-30 RX ADMIN — NITROGLYCERIN 1 INCH: 20 OINTMENT TOPICAL at 19:23

## 2023-10-30 RX ADMIN — ASPIRIN 324 MG: 81 TABLET, CHEWABLE ORAL at 16:07

## 2023-10-30 RX ADMIN — INSULIN LISPRO 3 UNITS: 100 INJECTION, SOLUTION INTRAVENOUS; SUBCUTANEOUS at 23:44

## 2023-10-30 RX ADMIN — SODIUM CHLORIDE 75 ML/HR: 9 INJECTION, SOLUTION INTRAVENOUS at 23:45

## 2023-10-30 RX ADMIN — HEPARIN SODIUM 4000 UNITS: 1000 INJECTION INTRAVENOUS; SUBCUTANEOUS at 19:31

## 2023-10-30 RX ADMIN — Medication 10 ML: at 23:45

## 2023-10-30 RX ADMIN — HEPARIN SODIUM 10 UNITS/KG/HR: 10000 INJECTION, SOLUTION INTRAVENOUS at 19:33

## 2023-10-30 RX ADMIN — INSULIN HUMAN 6 UNITS: 100 INJECTION, SOLUTION PARENTERAL at 17:59

## 2023-10-30 RX ADMIN — SODIUM CHLORIDE 1000 ML: 9 INJECTION, SOLUTION INTRAVENOUS at 17:59

## 2023-10-30 NOTE — ED PROVIDER NOTES
Subjective  History of Present Illness:    Chief Complaint: Chest pain  History of Present Illness: 53-year-old male with chest pain after a stress test earlier today, he had a stress test approximately 1 hour prior to arrival he states he tolerated the stress test well, but on his way home he began to have left-sided chest pain he felt the pain was radiating into his arm and jaw area.  He called back to the clinic where the stress test was performed with Dr. Garcias, they recommended he come to the ED.  Onset: Sudden onset  Duration: Just prior to arrival  Exacerbating / Alleviating factors: Recent stress test earlier today  Associated symptoms: Pain radiates into the left arm and left jaw area      Nurses Notes reviewed and agree, including vitals, allergies, social history and prior medical history.     REVIEW OF SYSTEMS: All systems reviewed and not pertinent unless noted.    Review of Systems   Respiratory:  Positive for chest tightness.    Cardiovascular:  Positive for chest pain.   All other systems reviewed and are negative.      Past Medical History:   Diagnosis Date    HLD (hyperlipidemia)     Kidney stones     Leg pain     Right leg    Mood disorder     Osteoarthritis        Allergies:    Patient has no known allergies.      Past Surgical History:   Procedure Laterality Date    CHOLECYSTECTOMY  2015    VASECTOMY  2002         Social History     Socioeconomic History    Marital status:    Tobacco Use    Smoking status: Never    Smokeless tobacco: Never   Vaping Use    Vaping Use: Never used   Substance and Sexual Activity    Alcohol use: Yes     Comment: Rarely    Drug use: Never    Sexual activity: Defer         Family History   Problem Relation Age of Onset    Cancer Mother     Heart disease Father     Heart attack Father     Heart disease Brother     Heart attack Brother     Diabetes Other        Objective  Physical Exam:  /84 (BP Location: Left arm, Patient Position: Lying)   Pulse 64    "Temp 98 °F (36.7 °C) (Oral)   Resp 18   Ht 185.4 cm (73\")   Wt 100 kg (221 lb 4.8 oz)   SpO2 97%   BMI 29.20 kg/m²      Physical Exam  Vitals and nursing note reviewed.   Constitutional:       Appearance: He is well-developed.   HENT:      Head: Normocephalic and atraumatic.      Mouth/Throat:      Mouth: Mucous membranes are moist.   Eyes:      Extraocular Movements: Extraocular movements intact.   Cardiovascular:      Rate and Rhythm: Normal rate and regular rhythm.   Pulmonary:      Effort: Pulmonary effort is normal.      Breath sounds: Normal breath sounds.   Abdominal:      Palpations: Abdomen is soft.   Musculoskeletal:         General: Normal range of motion.      Cervical back: Normal range of motion.   Skin:     General: Skin is warm and dry.   Neurological:      Mental Status: He is alert and oriented to person, place, and time.   Psychiatric:         Behavior: Behavior normal.         Thought Content: Thought content normal.         Judgment: Judgment normal.           Critical Care    Performed by: Pako Dumont Jr. PALinetteC  Authorized by: Penelope Guerrero DO    Critical care provider statement:     Critical care time (minutes):  45    Critical care time was exclusive of:  Separately billable procedures and treating other patients and teaching time    Critical care was necessary to treat or prevent imminent or life-threatening deterioration of the following conditions: Chest pain, NSTEMI, with elevated troponin, and heparin drip initiated critical care was necessary to prevent imminent or life deteriorating deterioration.    Critical care was time spent personally by me on the following activities:  Blood draw for specimens, development of treatment plan with patient or surrogate, discussions with consultants, discussions with primary provider, examination of patient, evaluation of patient's response to treatment, interpretation of cardiac output measurements, obtaining history from " patient or surrogate, ordering and performing treatments and interventions, ordering and review of laboratory studies, ordering and review of radiographic studies, pulse oximetry, re-evaluation of patient's condition and review of old charts    Care discussed with: admitting provider        ED Course:    ED Course as of 10/30/23 2308   Mon Oct 30, 2023   1748 Discussed the elevated glucose and hemoglobin A1c, with the patient and family, discussed with Dr. Arauz, patient will get insulin and a liter of fluids [CS]   1748 Glucose(!): 368 [CS]   1748 Hemoglobin A1C(!): 9.30 [CS]   1910 Discussed the case with Dr. Bryant, he agreed with the plan for admission, heparin drip started, nitroglycerin paste placed on patient's chest [CS]      ED Course User Index  [CS] Pako Dumont Jr., JENNIFER       Lab Results (last 24 hours)       Procedure Component Value Units Date/Time    High Sensitivity Troponin T [[id number]]  (Normal) Collected: 10/30/23 1606    Specimen: Blood Updated: 10/30/23 1636     HS Troponin T 11 ng/L     Narrative:      High Sensitive Troponin T Reference Range:  <10.0 ng/L- Negative Female for AMI  <15.0 ng/L- Negative Male for AMI  >=10 - Abnormal Female indicating possible myocardial injury.  >=15 - Abnormal Male indicating possible myocardial injury.   Clinicians would have to utilize clinical acumen, EKG, Troponin, and serial changes to determine if it is an Acute Myocardial Infarction or myocardial injury due to an underlying chronic condition.         CBC & Differential [[id number]]  (Normal) Collected: 10/30/23 1606    Specimen: Blood Updated: 10/30/23 1617    Narrative:      The following orders were created for panel order CBC & Differential.  Procedure                               Abnormality         Status                     ---------                               -----------         ------                     CBC Auto Differential[[id number]]        Normal              Final result                  Please view results for these tests on the individual orders.    Comprehensive Metabolic Panel [[id number]]  (Abnormal) Collected: 10/30/23 1606    Specimen: Blood Updated: 10/30/23 1637     Glucose 368 mg/dL      BUN 15 mg/dL      Creatinine 1.14 mg/dL      Sodium 134 mmol/L      Potassium 4.2 mmol/L      Comment: Slight hemolysis detected by analyzer. Results may be affected.        Chloride 100 mmol/L      CO2 22.0 mmol/L      Calcium 9.4 mg/dL      Total Protein 7.7 g/dL      Albumin 4.3 g/dL      ALT (SGPT) 47 U/L      AST (SGOT) 25 U/L      Alkaline Phosphatase 82 U/L      Total Bilirubin 0.5 mg/dL      Globulin 3.4 gm/dL      Comment: Calculated Result        A/G Ratio 1.3 g/dL      BUN/Creatinine Ratio 13.2     Anion Gap 12.0 mmol/L      eGFR 76.0 mL/min/1.73     Narrative:      GFR Normal >60  Chronic Kidney Disease <60  Kidney Failure <15      Lipase [[id number]]  (Normal) Collected: 10/30/23 1606    Specimen: Blood Updated: 10/30/23 1637     Lipase 54 U/L     BNP [[id number]]  (Normal) Collected: 10/30/23 1606    Specimen: Blood Updated: 10/30/23 1636     proBNP <36.0 pg/mL     Narrative:      This assay is used as an aid in the diagnosis of individuals suspected of having heart failure. It can be used as an aid in the diagnosis of acute decompensated heart failure (ADHF) in patients presenting with signs and symptoms of ADHF to the emergency department (ED). In addition, NT-proBNP of <300 pg/mL indicates ADHF is not likely.    Age Range Result Interpretation  NT-proBNP Concentration (pg/mL:      <50             Positive            >450                   Gray                 300-450                    Negative             <300    50-75           Positive            >900                  Gray                300-900                  Negative            <300      >75             Positive            >1800                  Gray                300-1800                  Negative            <300    CBC Auto  Differential [[id number]]  (Normal) Collected: 10/30/23 1606    Specimen: Blood Updated: 10/30/23 1617     WBC 6.98 10*3/mm3      RBC 5.14 10*6/mm3      Hemoglobin 15.9 g/dL      Hematocrit 45.9 %      MCV 89.3 fL      MCH 30.9 pg      MCHC 34.6 g/dL      RDW 13.2 %      RDW-SD 42.7 fl      MPV 11.9 fL      Platelets 160 10*3/mm3      Neutrophil % 62.0 %      Lymphocyte % 26.1 %      Monocyte % 10.2 %      Eosinophil % 1.0 %      Basophil % 0.4 %      Immature Grans % 0.3 %      Neutrophils, Absolute 4.33 10*3/mm3      Lymphocytes, Absolute 1.82 10*3/mm3      Monocytes, Absolute 0.71 10*3/mm3      Eosinophils, Absolute 0.07 10*3/mm3      Basophils, Absolute 0.03 10*3/mm3      Immature Grans, Absolute 0.02 10*3/mm3      nRBC 0.0 /100 WBC     Hemoglobin A1c [[id number]]  (Abnormal) Collected: 10/30/23 1606    Specimen: Blood Updated: 10/30/23 1706     Hemoglobin A1C 9.30 %     Narrative:      Hemoglobin A1C Ranges:    Increased Risk for Diabetes  5.7% to 6.4%  Diabetes                     >= 6.5%  Diabetic Goal                < 7.0%    Protime-INR [[id number]]  (Normal) Collected: 10/30/23 1606    Specimen: Blood Updated: 10/30/23 1923     Protime 13.3 Seconds      INR 1.00    aPTT [[id number]]  (Abnormal) Collected: 10/30/23 1606    Specimen: Blood Updated: 10/30/23 1923     PTT 27.0 seconds     Narrative:      PTT = The equivalent PTT values for the therapeutic range of heparin levels at 0.3 to 0.5 U/ml are 60 to 70 seconds.    High Sensitivity Troponin T 2Hr [[id number]]  (Abnormal) Collected: 10/30/23 1806    Specimen: Blood Updated: 10/30/23 1843     HS Troponin T 49 ng/L      Troponin T Delta 38 ng/L     Narrative:      High Sensitive Troponin T Reference Range:  <10.0 ng/L- Negative Female for AMI  <15.0 ng/L- Negative Male for AMI  >=10 - Abnormal Female indicating possible myocardial injury.  >=15 - Abnormal Male indicating possible myocardial injury.   Clinicians would have to utilize clinical acumen, EKG,  Troponin, and serial changes to determine if it is an Acute Myocardial Infarction or myocardial injury due to an underlying chronic condition.         Heparin Anti-Xa [[id number]]  (Abnormal) Collected: 10/30/23 2007    Specimen: Blood from Arm, Right Updated: 10/30/23 2040     Heparin Anti-Xa (UFH) 1.02 IU/ml     High Sensitivity Troponin T [[id number]]  (Abnormal) Collected: 10/30/23 2207    Specimen: Blood Updated: 10/30/23 2244     HS Troponin T 189 ng/L      Comment: Confirmed/called       Narrative:      High Sensitive Troponin T Reference Range:  <10.0 ng/L- Negative Female for AMI  <15.0 ng/L- Negative Male for AMI  >=10 - Abnormal Female indicating possible myocardial injury.  >=15 - Abnormal Male indicating possible myocardial injury.   Clinicians would have to utilize clinical acumen, EKG, Troponin, and serial changes to determine if it is an Acute Myocardial Infarction or myocardial injury due to an underlying chronic condition.                  XR Chest 1 View    Result Date: 10/30/2023  XR CHEST 1 VW Date of Exam: 10/30/2023 2:51 PM CDT Indication: Chest Pain Triage Protocol Comparison: None available. Findings: Cardiomediastinal silhouette is unremarkable. There is elevation of the right hemidiaphragm. No airspace disease, pneumothorax, nor pleural effusion.No acute osseous abnormality identified.     Impression: Impression: No acute process identified Electronically Signed: Vernon Carvalho MD  10/30/2023 3:17 PM CDT  Workstation ID: [id number]        Medical Decision Making  Patient Presentation 53-year-old female presents with chest pain after a stress test, significant risk due to family history, presented with chest pain and an elevation in his blood pressure    DDX. Differential would include acute MI, chest wall pain, reflux disease, panic disorder anxiety, pericarditis, pneumonia, heart failure, PE, acute dissection.       Data Review/ Non ED Records /Analysis/Ordering unique tests reviewed and  summarized previous medical records, including non ED records labs, radiology reports a CBC, CMP, lipase, troponin and BNP were performed    Independent Review Studies interpret all labs including CBC, CMP, lipase, troponin and BNP discussed with the patient family at the bedside    Intervention/Re-evaluation intervention included aspirin, close cardiac monitoring, and a heparin drip after elevation in troponin, on reevaluation patient is stable    Independent Clinician discussed with my supervising physician Dr. Arauz, discussed with the on-call cardiologist Dr. Bryant, discussed with the on-call hospitalist Dr. Brooks who accepted    Risk Stratification tools/clinical decision rules patient has significant cardiac risk due to strong family history, presented with chest pain after a treadmill stress test, second troponin increased by 38, assistant with an NSTEMI, patient was given a bolus of heparin and heparin drip and nitroglycerin paste placed on his chest, he remained stable, and will be admitted with potential cardiac intervention in the morning    Shared Decision Making discussed this with the patient and family they agree with the plan    Disposition patient stable for admission    Problems Addressed:  New onset type 2 diabetes mellitus: complicated acute illness or injury  NSTEMI (non-ST elevated myocardial infarction): complicated acute illness or injury    Amount and/or Complexity of Data Reviewed  Labs: ordered. Decision-making details documented in ED Course.    Risk  OTC drugs.  Prescription drug management.  Decision regarding hospitalization.          Final diagnoses:   NSTEMI (non-ST elevated myocardial infarction)   New onset type 2 diabetes mellitus          Pako Dumont Jr., PA-C  10/30/23 3901

## 2023-10-30 NOTE — PROGRESS NOTES
HEPARIN INFUSION  Kong Murphy is a  53 y.o. male receiving heparin infusion.     Therapy for (VTE/Cardiac): Cardiac  Patient Weight: 99.8 kg  Initial Bolus (Y/N): Y  Any Bolus (Y/N): Y        Signs or Symptoms of Bleeding:     Cardiac or Other (Not VTE)   Initial Bolus: 60 units/kg (Max 4,000 units)  Initial rate: 12 units/kg/hr (Max 1,000 units/hr)   Anti Xa Rebolus Infusion Hold time Change infusion Dose (Units/kg/hr) Next Anti Xa or aPTT Level Due   < 0.11 50 Units/kg  (4000 Units Max) None Increase by  3 Units/kg/hr 6 hours   0.11- 0.19 25 Units/kg  (2000 Units Max) None Increase by  2 Units/kg/hr 6 hours   0.2 - 0.29 0 None Increase by  1 Units/kg/hr 6 hours   0.3 - 0.5 0 None No Change 6 hours (after 2 consecutive levels in range check qAM)   0.51 - 0.6 0 None Decrease by  1 Units/kg/hr 6 hours   0.61 - 0.8 0 30 Minutes Decrease by  2 Units/kg/hr 6 hours   0.81 - 1 0 60 Minutes Decrease by  3 Units/kg/hr 6 hours   >1 0 Hold  After Anti Xa less than 0.5 decrease previous rate by  4 Units/kg/hr  Every 2 hours until Anti Xa  less than 0.5 then when infusion restarts in 6 hours       Results from last 7 days   Lab Units 10/30/23  1606   INR  1.00   HEMOGLOBIN g/dL 15.9   HEMATOCRIT % 45.9   PLATELETS 10*3/mm3 160          Date   Time   Anti-Xa Current Rate (Unit/kg/hr) Bolus   (Units) Rate Change   (Unit/kg/hr) New Rate (Unit/kg/hr) Next   Anti-Xa Comments  Pump Check Daily   10/30 1857 pending 0 +4000 +10 10 0200 RADHA RN, Verified pump                                                                                                                                                                                                                                 Sriram Olmstead PharmD  10/30/2023  19:37 EDT

## 2023-10-30 NOTE — H&P
"    [facility]  HISTORY AND PHYSICAL    Patient Name: Kong Murphy  : 1970  MRN: [id number]  Primary Care Physician: Jose Alfredo Rodriguez MD  Date of admission: 10/30/2023    Subjective   Subjective     Chief Complaint:  Chest pain     HPI:  Kong Murphy is a 53 y.o. male w/ a hx of HLD, kidney stones, anxiety, arthritis who presented to the ED w/ c/o chest pain.  Pt had a routine cardiac stress test today w/ Dr. Garcias. Stress test scheduled 2/2 pt's family hx of CAD (father- AMI at age 60, brother AMI at age 48). Pt had not been symptomatic prior to the test. Pt tolerated the stress test w/ no issues before, during or immediately after. Pt left the Cardiologist's office and about ~5 minutes down the road he developed central chest pain. Pain radiated to his jaws and his right arm. Pt denies associated dyspnea, nausea or diaphoresis. At worst pain rated 8/10. Pain lasted for several hours and did not start improving until he was in an ED room. Pain currently rated 1/10. Pt w/ no prior stents or known CAD. Remote hx of \"normal\" stress test >10 yrs ago, no prior cardiac cath.   Pt evaluated in the ED. CXR unremarkable. Initial troponin 11, repeat 49. Pt admitted to the hospital medicine service for further evaluation.     Also noted to be hypertensive - bp significantly high on arrival and during exam. Complaining of more chest discomfort after placement of nitro paste      Review of Systems   Constitutional: Negative.  Negative for chills, diaphoresis, fatigue, fever and unexpected weight change.   HENT: Negative.  Negative for congestion, postnasal drip, rhinorrhea, sinus pressure, sinus pain, sneezing, sore throat and trouble swallowing.    Eyes: Negative.  Negative for visual disturbance.   Respiratory: Negative.  Negative for cough, chest tightness, shortness of breath and wheezing.    Cardiovascular:  Positive for chest pain. Negative for palpitations and " leg swelling.   Gastrointestinal: Negative.  Negative for abdominal distention, abdominal pain, blood in stool, constipation, diarrhea, nausea and vomiting.   Endocrine: Negative.    Genitourinary: Negative.  Negative for decreased urine volume, difficulty urinating, dysuria, flank pain, frequency, hematuria and urgency.   Musculoskeletal: Negative.  Negative for arthralgias, back pain, myalgias, neck pain and neck stiffness.   Skin: Negative.  Negative for wound.   Allergic/Immunologic: Negative.  Negative for immunocompromised state.   Neurological: Negative.  Negative for dizziness, tremors, seizures, syncope, facial asymmetry, speech difficulty, weakness, light-headedness, numbness and headaches.   Hematological: Negative.  Does not bruise/bleed easily.   Psychiatric/Behavioral: Negative.  Negative for confusion.    All other systems reviewed and are negative.     Personal History     Past Medical History:   Diagnosis Date    HLD (hyperlipidemia)     Kidney stones     Leg pain     Right leg    Mood disorder     Osteoarthritis      Past Surgical History:   Procedure Laterality Date    CHOLECYSTECTOMY  2015    VASECTOMY  2002     Family History: family history includes Cancer in his mother; Diabetes in an other family member; Heart attack in his brother and father; Heart disease in his brother and father.     Social History:  reports that he has never smoked. He has never used smokeless tobacco. He reports current alcohol use. He reports that he does not use drugs.  Social History     Social History Narrative    Not on file     Medications:  meloxicam and sertraline    No Known Allergies    Objective   Objective     Vital Signs:   Temp:  [98 °F (36.7 °C)] 98 °F (36.7 °C)  Heart Rate:  [63-75] 64  Resp:  [18-20] 18  BP: (130-172)/() 130/84    Physical Exam     Constitutional: Awake, alert; non-toxic appearing   Eyes: PERRLA, sclerae anicteric, no conjunctival injection  HENT: NCAT, mucous membranes  moist  Neck: Supple, no thyromegaly, no lymphadenopathy, trachea midline  Respiratory: Clear to auscultation bilaterally, nonlabored respirations   Cardiovascular: RRR, no murmurs, rubs, or gallops, no peripheral edema   Gastrointestinal: Positive bowel sounds, soft, nontender, nondistended  Musculoskeletal: Normal ROM bilaterally   Psychiatric: Appropriate affect, cooperative  Neurologic: Oriented x 3, strength symmetric in all extremities, Cranial Nerves grossly intact to confrontation, speech clear  Skin: No rashes, lesions or wounds    Result Review:  I have personally reviewed the results from the time of this admission to 10/30/2023 21:24 EDT and agree with these findings:  [x]  Laboratory list / accordion  []  Microbiology  [x]  Radiology  [x]  EKG/Telemetry   []  Cardiology/Vascular   []  Pathology  [x]  Old records    LAB RESULTS:      Lab 10/30/23  2007 10/30/23  1606   WBC  --  6.98   HEMOGLOBIN  --  15.9   HEMATOCRIT  --  45.9   PLATELETS  --  160   NEUTROS ABS  --  4.33   IMMATURE GRANS (ABS)  --  0.02   LYMPHS ABS  --  1.82   MONOS ABS  --  0.71   EOS ABS  --  0.07   MCV  --  89.3   PROTIME  --  13.3   APTT  --  27.0*   HEPARIN ANTI-XA 1.02*  --          Lab 10/30/23  1606   SODIUM 134*   POTASSIUM 4.2   CHLORIDE 100   CO2 22.0   ANION GAP 12.0   BUN 15   CREATININE 1.14   EGFR 76.0   GLUCOSE 368*   CALCIUM 9.4   HEMOGLOBIN A1C 9.30*         Lab 10/30/23  1606   TOTAL PROTEIN 7.7   ALBUMIN 4.3   GLOBULIN 3.4   ALT (SGPT) 47*   AST (SGOT) 25   BILIRUBIN 0.5   ALK PHOS 82   LIPASE 54         Lab 10/30/23  1806 10/30/23  1606   PROBNP  --  <36.0   HSTROP T 49* 11   PROTIME  --  13.3   INR  --  1.00                 Brief Urine Lab Results       None          Microbiology Results (last 10 days)       ** No results found for the last 240 hours. **          XR Chest 1 View    Result Date: 10/30/2023  XR CHEST 1 VW Date of Exam: 10/30/2023 2:51 PM CDT Indication: Chest Pain Triage Protocol Comparison: None  available. Findings: Cardiomediastinal silhouette is unremarkable. There is elevation of the right hemidiaphragm. No airspace disease, pneumothorax, nor pleural effusion.No acute osseous abnormality identified.     Impression: Impression: No acute process identified Electronically Signed: Vernon Carvalho MD  10/30/2023 3:17 PM CDT  Workstation ID: [id number]       Assessment & Plan   Assessment & Plan       Chest pain    Elevated troponin    Newly diagnosed diabetes    Anxiety    Kong Murphy is a 53 y.o. male w/ a hx of HLD, kidney stones, anxiety, arthritis who presented to the ED w/ c/o chest pain.    **Chest pain, elevated troponin (11, repeat 49)  -positive family hx, pt's father w/ first AMI at age 60 and brother first AMI at age 48  -pt had cardiac stress test outpt today w/ Dr. Garcias, developed pain after leaving office  -noted to be hypertensive    -serial EKGs  -trend troponin - next one at 2200    -CXR unremarkable   -tsh, lipid panel w/ am labs   -NPO after MN  -NS @ 75ml/hour x 12 hours  -s/p 1 liter NS bolus given in ED  -s/p ASA 324mg   -heparin infusion started in ED; continue per protocol   -nitropaste   -pt reports hx of HLD but not on routine statin; FLP pending   -symptom mgt  -ED provider spoke w/ Dr. Garcias (recommended heparin, nitro); consulted to see in am     **Newly diagnosed T2DM  -hem A1c 9.3%  -glucose 368 upon arrival to ED  -FSBG q ac/hs w/ LDSS  -IVF  -diabetic educator consult in am     **Anxiety   -continue Zoloft     DVT prophylaxis:  Heparin infusion     CODE STATUS:  Full  Code Status (Patient has no pulse and is not breathing): CPR (Attempt to Resuscitate)  Medical Interventions (Patient has pulse or is breathing): Full Support    Expected Discharge  Expected Discharge Date: 11/2/2023; Expected Discharge Time:     This note has been completed as part of a split-shared workflow.     Signature: Electronically signed by MILLIE King, 10/30/23, 8:23 PM EDT.    Time  spent: 55 minutes     Above note reviewed - repeat EKG ordered.  Added nifedipine IR, can titrate as needed  Plan for possible cath in AM

## 2023-10-31 PROBLEM — I21.4 NSTEMI (NON-ST ELEVATED MYOCARDIAL INFARCTION): Status: ACTIVE | Noted: 2023-10-30

## 2023-10-31 LAB
ANION GAP SERPL CALCULATED.3IONS-SCNC: 13 MMOL/L (ref 5–15)
BILIRUB UR QL STRIP: NEGATIVE
BUN SERPL-MCNC: 14 MG/DL (ref 6–20)
BUN/CREAT SERPL: 14.1 (ref 7–25)
CALCIUM SPEC-SCNC: 8.9 MG/DL (ref 8.6–10.5)
CATH EF ESTIMATED: 60 %
CHLORIDE SERPL-SCNC: 103 MMOL/L (ref 98–107)
CHOLEST SERPL-MCNC: 150 MG/DL (ref 0–200)
CLARITY UR: CLEAR
CO2 SERPL-SCNC: 23 MMOL/L (ref 22–29)
COLOR UR: YELLOW
CREAT SERPL-MCNC: 0.99 MG/DL (ref 0.76–1.27)
EGFRCR SERPLBLD CKD-EPI 2021: 90 ML/MIN/1.73
GLUCOSE BLDC GLUCOMTR-MCNC: 151 MG/DL (ref 70–130)
GLUCOSE BLDC GLUCOMTR-MCNC: 160 MG/DL (ref 70–130)
GLUCOSE BLDC GLUCOMTR-MCNC: 194 MG/DL (ref 70–130)
GLUCOSE BLDC GLUCOMTR-MCNC: 210 MG/DL (ref 70–130)
GLUCOSE BLDC GLUCOMTR-MCNC: 253 MG/DL (ref 70–130)
GLUCOSE SERPL-MCNC: 249 MG/DL (ref 65–99)
GLUCOSE UR STRIP-MCNC: ABNORMAL MG/DL
HDLC SERPL-MCNC: 33 MG/DL (ref 40–60)
HGB UR QL STRIP.AUTO: NEGATIVE
KETONES UR QL STRIP: NEGATIVE
LDLC SERPL CALC-MCNC: 67 MG/DL (ref 0–100)
LDLC/HDLC SERPL: 1.65 {RATIO}
LEUKOCYTE ESTERASE UR QL STRIP.AUTO: NEGATIVE
MAGNESIUM SERPL-MCNC: 2 MG/DL (ref 1.6–2.6)
NITRITE UR QL STRIP: NEGATIVE
PH UR STRIP.AUTO: 6 [PH] (ref 5–8)
POTASSIUM SERPL-SCNC: 3.8 MMOL/L (ref 3.5–5.2)
PROT UR QL STRIP: NEGATIVE
QT INTERVAL: 390 MS
QT INTERVAL: 408 MS
QT INTERVAL: 410 MS
QTC INTERVAL: 412 MS
QTC INTERVAL: 420 MS
QTC INTERVAL: 448 MS
SODIUM SERPL-SCNC: 139 MMOL/L (ref 136–145)
SP GR UR STRIP: 1.02 (ref 1–1.03)
TRIGL SERPL-MCNC: 313 MG/DL (ref 0–150)
TROPONIN T SERPL HS-MCNC: 330 NG/L
TSH SERPL DL<=0.05 MIU/L-ACNC: 2.47 UIU/ML (ref 0.27–4.2)
UFH PPP CHRO-ACNC: 0.1 IU/ML (ref 0.3–0.7)
UROBILINOGEN UR QL STRIP: ABNORMAL
VLDLC SERPL-MCNC: 50 MG/DL (ref 5–40)

## 2023-10-31 PROCEDURE — C9600 PERC DRUG-EL COR STENT SING: HCPCS | Performed by: INTERNAL MEDICINE

## 2023-10-31 PROCEDURE — 92928 PRQ TCAT PLMT NTRAC ST 1 LES: CPT | Performed by: INTERNAL MEDICINE

## 2023-10-31 PROCEDURE — 25510000001 IOPAMIDOL PER 1 ML: Performed by: INTERNAL MEDICINE

## 2023-10-31 PROCEDURE — 81003 URINALYSIS AUTO W/O SCOPE: CPT | Performed by: NURSE PRACTITIONER

## 2023-10-31 PROCEDURE — 4A023N7 MEASUREMENT OF CARDIAC SAMPLING AND PRESSURE, LEFT HEART, PERCUTANEOUS APPROACH: ICD-10-PCS | Performed by: INTERNAL MEDICINE

## 2023-10-31 PROCEDURE — 25010000002 MIDAZOLAM PER 1 MG: Performed by: INTERNAL MEDICINE

## 2023-10-31 PROCEDURE — 25010000002 BIVALIRUDIN TRIFLUOROACETATE 250 MG RECONSTITUTED SOLUTION 1 EACH VIAL: Performed by: INTERNAL MEDICINE

## 2023-10-31 PROCEDURE — 63710000001 INSULIN LISPRO (HUMAN) PER 5 UNITS: Performed by: NURSE PRACTITIONER

## 2023-10-31 PROCEDURE — B2151ZZ FLUOROSCOPY OF LEFT HEART USING LOW OSMOLAR CONTRAST: ICD-10-PCS | Performed by: INTERNAL MEDICINE

## 2023-10-31 PROCEDURE — 80048 BASIC METABOLIC PNL TOTAL CA: CPT | Performed by: NURSE PRACTITIONER

## 2023-10-31 PROCEDURE — 84443 ASSAY THYROID STIM HORMONE: CPT | Performed by: NURSE PRACTITIONER

## 2023-10-31 PROCEDURE — 83735 ASSAY OF MAGNESIUM: CPT | Performed by: NURSE PRACTITIONER

## 2023-10-31 PROCEDURE — C1874 STENT, COATED/COV W/DEL SYS: HCPCS | Performed by: INTERNAL MEDICINE

## 2023-10-31 PROCEDURE — 25010000002 FENTANYL CITRATE (PF) 50 MCG/ML SOLUTION: Performed by: INTERNAL MEDICINE

## 2023-10-31 PROCEDURE — 85520 HEPARIN ASSAY: CPT | Performed by: INTERNAL MEDICINE

## 2023-10-31 PROCEDURE — B2111ZZ FLUOROSCOPY OF MULTIPLE CORONARY ARTERIES USING LOW OSMOLAR CONTRAST: ICD-10-PCS | Performed by: INTERNAL MEDICINE

## 2023-10-31 PROCEDURE — 25810000003 SODIUM CHLORIDE 0.9 % SOLUTION: Performed by: INTERNAL MEDICINE

## 2023-10-31 PROCEDURE — C1887 CATHETER, GUIDING: HCPCS | Performed by: INTERNAL MEDICINE

## 2023-10-31 PROCEDURE — 25010000002 HEPARIN (PORCINE) PER 1000 UNITS: Performed by: INTERNAL MEDICINE

## 2023-10-31 PROCEDURE — 25010000002 BIVALIRUDIN TRIFLUOROACETATE 250 MG RECONSTITUTED SOLUTION: Performed by: INTERNAL MEDICINE

## 2023-10-31 PROCEDURE — 63710000001 INSULIN LISPRO (HUMAN) PER 5 UNITS: Performed by: INTERNAL MEDICINE

## 2023-10-31 PROCEDURE — 80061 LIPID PANEL: CPT | Performed by: NURSE PRACTITIONER

## 2023-10-31 PROCEDURE — C1769 GUIDE WIRE: HCPCS | Performed by: INTERNAL MEDICINE

## 2023-10-31 PROCEDURE — C1725 CATH, TRANSLUMIN NON-LASER: HCPCS | Performed by: INTERNAL MEDICINE

## 2023-10-31 PROCEDURE — 84484 ASSAY OF TROPONIN QUANT: CPT | Performed by: INTERNAL MEDICINE

## 2023-10-31 PROCEDURE — 93458 L HRT ARTERY/VENTRICLE ANGIO: CPT | Performed by: INTERNAL MEDICINE

## 2023-10-31 PROCEDURE — 82948 REAGENT STRIP/BLOOD GLUCOSE: CPT

## 2023-10-31 PROCEDURE — 027034Z DILATION OF CORONARY ARTERY, ONE ARTERY WITH DRUG-ELUTING INTRALUMINAL DEVICE, PERCUTANEOUS APPROACH: ICD-10-PCS | Performed by: INTERNAL MEDICINE

## 2023-10-31 PROCEDURE — C1894 INTRO/SHEATH, NON-LASER: HCPCS | Performed by: INTERNAL MEDICINE

## 2023-10-31 DEVICE — XIENCE SKYPOINT™ EVEROLIMUS ELUTING CORONARY STENT SYSTEM 3.50 MM X 12 MM / RAPID-EXCHANGE
Type: IMPLANTABLE DEVICE | Site: HEART | Status: FUNCTIONAL
Brand: XIENCE SKYPOINT™

## 2023-10-31 RX ORDER — ROSUVASTATIN CALCIUM 20 MG/1
20 TABLET, COATED ORAL NIGHTLY
Status: DISCONTINUED | OUTPATIENT
Start: 2023-10-31 | End: 2023-11-01 | Stop reason: HOSPADM

## 2023-10-31 RX ORDER — SODIUM CHLORIDE 9 MG/ML
100 INJECTION, SOLUTION INTRAVENOUS CONTINUOUS
Status: DISCONTINUED | OUTPATIENT
Start: 2023-11-01 | End: 2023-11-01 | Stop reason: HOSPADM

## 2023-10-31 RX ORDER — HEPARIN SODIUM 1000 [USP'U]/ML
4000 INJECTION, SOLUTION INTRAVENOUS; SUBCUTANEOUS ONCE
Qty: 10 ML | Refills: 0 | Status: COMPLETED | OUTPATIENT
Start: 2023-10-31 | End: 2023-10-31

## 2023-10-31 RX ORDER — BIVALIRUDIN 250 MG/5ML
INJECTION, POWDER, LYOPHILIZED, FOR SOLUTION INTRAVENOUS
Status: DISCONTINUED | OUTPATIENT
Start: 2023-10-31 | End: 2023-10-31 | Stop reason: HOSPADM

## 2023-10-31 RX ORDER — LIDOCAINE HYDROCHLORIDE 10 MG/ML
INJECTION, SOLUTION EPIDURAL; INFILTRATION; INTRACAUDAL; PERINEURAL
Status: DISCONTINUED | OUTPATIENT
Start: 2023-10-31 | End: 2023-10-31 | Stop reason: HOSPADM

## 2023-10-31 RX ORDER — CLOPIDOGREL BISULFATE 75 MG/1
75 TABLET ORAL DAILY
Status: DISCONTINUED | OUTPATIENT
Start: 2023-11-01 | End: 2023-11-01 | Stop reason: HOSPADM

## 2023-10-31 RX ORDER — NITROGLYCERIN 0.4 MG/1
0.4 TABLET SUBLINGUAL
Status: DISCONTINUED | OUTPATIENT
Start: 2023-10-31 | End: 2023-11-01 | Stop reason: HOSPADM

## 2023-10-31 RX ORDER — MIDAZOLAM HYDROCHLORIDE 1 MG/ML
INJECTION INTRAMUSCULAR; INTRAVENOUS
Status: DISCONTINUED | OUTPATIENT
Start: 2023-10-31 | End: 2023-10-31 | Stop reason: HOSPADM

## 2023-10-31 RX ORDER — HEPARIN SODIUM 1000 [USP'U]/ML
INJECTION, SOLUTION INTRAVENOUS; SUBCUTANEOUS
Status: DISCONTINUED | OUTPATIENT
Start: 2023-10-31 | End: 2023-10-31 | Stop reason: HOSPADM

## 2023-10-31 RX ORDER — ALPRAZOLAM 0.25 MG/1
0.25 TABLET ORAL 3 TIMES DAILY PRN
Status: DISCONTINUED | OUTPATIENT
Start: 2023-10-31 | End: 2023-11-01 | Stop reason: HOSPADM

## 2023-10-31 RX ORDER — ASPIRIN 81 MG/1
TABLET, CHEWABLE ORAL
Status: DISCONTINUED | OUTPATIENT
Start: 2023-10-31 | End: 2023-10-31 | Stop reason: HOSPADM

## 2023-10-31 RX ORDER — NEBIVOLOL 2.5 MG/1
2.5 TABLET ORAL
Status: DISCONTINUED | OUTPATIENT
Start: 2023-10-31 | End: 2023-11-01

## 2023-10-31 RX ORDER — NICARDIPINE HCL-0.9% SOD CHLOR 1 MG/10 ML
SYRINGE (ML) INTRAVENOUS
Status: DISCONTINUED | OUTPATIENT
Start: 2023-10-31 | End: 2023-10-31 | Stop reason: HOSPADM

## 2023-10-31 RX ORDER — DIPHENHYDRAMINE HYDROCHLORIDE 50 MG/ML
25 INJECTION INTRAMUSCULAR; INTRAVENOUS EVERY 6 HOURS PRN
Status: DISCONTINUED | OUTPATIENT
Start: 2023-10-31 | End: 2023-11-01 | Stop reason: HOSPADM

## 2023-10-31 RX ORDER — ASPIRIN 81 MG/1
81 TABLET ORAL DAILY
Status: DISCONTINUED | OUTPATIENT
Start: 2023-11-01 | End: 2023-11-01 | Stop reason: HOSPADM

## 2023-10-31 RX ORDER — CLOPIDOGREL BISULFATE 75 MG/1
TABLET ORAL
Status: DISCONTINUED | OUTPATIENT
Start: 2023-10-31 | End: 2023-10-31 | Stop reason: HOSPADM

## 2023-10-31 RX ORDER — PRAVASTATIN SODIUM 40 MG
40 TABLET ORAL DAILY
COMMUNITY
End: 2023-11-01 | Stop reason: HOSPADM

## 2023-10-31 RX ORDER — ACETAMINOPHEN 325 MG/1
650 TABLET ORAL EVERY 4 HOURS PRN
Status: DISCONTINUED | OUTPATIENT
Start: 2023-10-31 | End: 2023-11-01 | Stop reason: HOSPADM

## 2023-10-31 RX ORDER — ACETAMINOPHEN 325 MG/1
650 TABLET ORAL EVERY 6 HOURS PRN
Status: DISCONTINUED | OUTPATIENT
Start: 2023-10-31 | End: 2023-10-31 | Stop reason: SDUPTHER

## 2023-10-31 RX ORDER — FENTANYL CITRATE 50 UG/ML
INJECTION, SOLUTION INTRAMUSCULAR; INTRAVENOUS
Status: DISCONTINUED | OUTPATIENT
Start: 2023-10-31 | End: 2023-10-31 | Stop reason: HOSPADM

## 2023-10-31 RX ADMIN — ALPRAZOLAM 0.25 MG: 0.25 TABLET ORAL at 20:43

## 2023-10-31 RX ADMIN — ROSUVASTATIN 20 MG: 20 TABLET, FILM COATED ORAL at 20:37

## 2023-10-31 RX ADMIN — INSULIN LISPRO 4 UNITS: 100 INJECTION, SOLUTION INTRAVENOUS; SUBCUTANEOUS at 09:06

## 2023-10-31 RX ADMIN — INSULIN LISPRO 3 UNITS: 100 INJECTION, SOLUTION INTRAVENOUS; SUBCUTANEOUS at 12:49

## 2023-10-31 RX ADMIN — SENNOSIDES AND DOCUSATE SODIUM 2 TABLET: 8.6; 5 TABLET ORAL at 20:37

## 2023-10-31 RX ADMIN — NEBIVOLOL 2.5 MG: 2.5 TABLET ORAL at 16:14

## 2023-10-31 RX ADMIN — ACETAMINOPHEN 650 MG: 325 TABLET ORAL at 04:32

## 2023-10-31 RX ADMIN — ACETAMINOPHEN 650 MG: 325 TABLET ORAL at 11:00

## 2023-10-31 RX ADMIN — HEPARIN SODIUM 4000 UNITS: 1000 INJECTION INTRAVENOUS; SUBCUTANEOUS at 09:27

## 2023-10-31 RX ADMIN — SERTRALINE HYDROCHLORIDE 200 MG: 100 TABLET ORAL at 16:20

## 2023-10-31 RX ADMIN — INSULIN LISPRO 2 UNITS: 100 INJECTION, SOLUTION INTRAVENOUS; SUBCUTANEOUS at 20:44

## 2023-10-31 RX ADMIN — Medication 10 ML: at 20:44

## 2023-10-31 NOTE — PLAN OF CARE
Goal Outcome Evaluation:   Left heart cath performed through right radial artery. Blockage found in LAD and 1 stent was placed. TR band placed with 12 cc of air. Site clean, dry, and intact. Patient has had no c/o pain or discomfort. VS stable. NSR on tele.

## 2023-10-31 NOTE — CONSULTS
Cardiology Consult - [facility]    Kong Murphy     S528/1  1970  [address]  [facility] [address]         Admission Date:  10/30/2023    Consultation Date:  10/31/23        PCP:  Jose Alfredo Rodriguez MD  Referring MD:  Dr. Guerrero  Consulting MD:  Dr. Stu Gacrias          CC:  Chest Pain    Reason for Consult: NSTEMI      Chest pain    Elevated troponin    Newly diagnosed diabetes    Anxiety      Allergies:  has No Known Allergies.    Medications Prior to Admission   Medication Sig Dispense Refill Last Dose    meloxicam (MOBIC) 15 MG tablet 1 PO Daily with food. 90 tablet 1     sertraline (ZOLOFT) 100 MG tablet Take 2 tablets by mouth Daily.          heparin, 10 Units/kg/hr, Last Rate: 10 Units/kg/hr (10/30/23 1933)  Pharmacy to Dose Heparin,   sodium chloride, 75 mL/hr, Last Rate: 75 mL/hr (10/30/23 3805)            HPI:  Kong Murphy is a 52 yo  CM with PMHx anxiety, HLP, osteoarthritis.  He was seen in our office yesterday where he underwent nuclear stress test.  On his way home from office, he developed chest pain.  It progressively worsened as time progressed.  It was described as a mid sternal pressure that radiated into his jaw and left arm.  He presented to the [facility] ED  4pm.  No acute EKG findings with initial HST being negative.  Repeat troponins elevated with current peak troponin 189.  He was admitted to hospitalist services, started on Heparin gtt.  Cardiology has been asked to seen in consultation.    At time of consultation, patient is currently chest pain free.  He has not been up out of bed.  He does report headache - had Nitro Paste on chest wall.  Serial EKGs continue to show NSR without acute findings.  A1C is 9.3 with no prior knowledge of or diagnosis of diabetes mellitus.  There is a family history.  Mr. Murphy states he woks out nearly 5-6 days a week and reports no exertional angina, no dyspnea or pre syncope.  He is on Pravastatin for  "lipid control.  He does admit to anxiety but states this is different and he 's never experienced any type of chest discomfort like this in the past.              Social History     Socioeconomic History    Marital status:    Tobacco Use    Smoking status: Never    Smokeless tobacco: Never   Vaping Use    Vaping Use: Never used   Substance and Sexual Activity    Alcohol use: Yes     Comment: Rarely    Drug use: Never    Sexual activity: Defer     Family History   Problem Relation Age of Onset    Cancer Mother     Heart disease Father     Heart attack Father     Heart disease Brother     Heart attack Brother     Diabetes Other      Past Surgical History:   Procedure Laterality Date    CHOLECYSTECTOMY  2015    VASECTOMY  2002     ROS: Pertinent items are noted in HPI, all other systems reviewed and negative     Objective     height is 185.4 cm (73\") and weight is 100 kg (221 lb 4.8 oz). His oral temperature is 97.9 °F (36.6 °C). His blood pressure is 128/81 and his pulse is 61. His respiration is 14 and oxygen saturation is 94%.    Intake/Output Summary (Last 24 hours) at 10/31/2023 0842  Last data filed at 10/30/2023 1920  Gross per 24 hour   Intake 1000 ml   Output --   Net 1000 ml     Intake/Output                   10/30/23 0701 - 10/31/23 0700     [id number] 1509-8167 Total              Intake    IV Piggyback  --  1000 1000    Total Intake -- 1000 1000       Output    Total Output -- -- --               10/30/23  1534 10/30/23  2106   Weight: 99.8 kg (220 lb) 100 kg (221 lb 4.8 oz)          Physical Exam:  General Appearance:    Alert, cooperative, in no acute distress   Head:    Normocephalic, without obvious abnormality, atraumatic   Eyes:            Lids and lashes normal, conjunctivae and sclerae normal, no   icterus, no pallor, corneas clear, PERRLA   Ears:    Ears appear intact with no abnormalities noted   Throat:   No oral lesions, no thrush, oral mucosa moist   Neck:   No adenopathy, supple, " trachea midline, no thyromegaly, no carotid bruit, no JVD   Back:     No kyphosis present, no scoliosis present, no skin lesions,    erythema or scars, no tenderness to percussion or                   palpation, range of motion normal   Lungs:     Clear to auscultation,respirations regular, even and               unlabored    Heart:    Regular rhythm and normal rate, normal S1 and S2, no         murmur, no gallop, no rub, no click   Abdomen:     Normal bowel sounds, no masses, no organomegaly, soft     nontender, nondistended, no guarding, no rebound   tenderness   Extremities:   Moves all extremities well,  no cyanosis, no redness, no edema   Pulses:   Pulses palpable and equal bilaterally   Skin:   No bleeding, bruising or rash   Lymph nodes:   No palpable adenopathy   Neurologic:   Cranial nerves 2 - 12 grossly intact, sensation intact, DTR     present and equal bilaterally          Results Review:  I personally reviewed the patient's clinical results.  Results from last 7 days   Lab Units 10/30/23  1606   WBC 10*3/mm3 6.98   HEMOGLOBIN g/dL 15.9   HEMATOCRIT % 45.9   PLATELETS 10*3/mm3 160     Results from last 7 days   Lab Units 10/31/23  0414 10/30/23  1606   SODIUM mmol/L 139 134*   POTASSIUM mmol/L 3.8 4.2   CHLORIDE mmol/L 103 100   CO2 mmol/L 23.0 22.0   BUN mg/dL 14 15   CREATININE mg/dL 0.99 1.14   CALCIUM mg/dL 8.9 9.4   BILIRUBIN mg/dL  --  0.5   ALK PHOS U/L  --  82   ALT (SGPT) U/L  --  47*   AST (SGOT) U/L  --  25   GLUCOSE mg/dL 249* 368*       Results from last 7 days   Lab Units 10/30/23  1606   INR  1.00     Results from last 7 days   Lab Units 10/31/23  0414   TSH uIU/mL 2.470     Results from last 7 days   Lab Units 10/31/23  0414   CHOLESTEROL mg/dL 150   TRIGLYCERIDES mg/dL 313*   HDL CHOL mg/dL 33*   LDL CHOL mg/dL 67     Results from last 7 days   Lab Units 10/30/23  1606   PROBNP pg/mL <36.0             Radiology:  Imaging Results (Last 72 Hours)       Procedure Component Value Units  Date/Time    XR Chest 1 View [[id number]] Collected: 10/30/23 1617     Updated: 10/30/23 1621    Narrative:      XR CHEST 1 VW    Date of Exam: 10/30/2023 2:51 PM CDT    Indication: Chest Pain Triage Protocol    Comparison: None available.    Findings:  Cardiomediastinal silhouette is unremarkable. There is elevation of the right hemidiaphragm. No airspace disease, pneumothorax, nor pleural effusion.No acute osseous abnormality identified.      Impression:      Impression:  No acute process identified      Electronically Signed: Vernon Carvalho MD    10/30/2023 3:17 PM CDT    Workstation ID: [id number]              Tele:  NSR    ASSESSMENT:  -  52 yo CM who underwent nuclear MPS yesterday in office presents to [facility] ED with chest pain ~1 hour after undergoing exercise stress test in office.  Labs consistent with NSTEMI.   -  HLP, on statin  -  Hypertriglyceridemia  -  Newly diagnosed DMII, A1C 9.3  -  Anxiety        PLAN:  -  Admitted to hospitalist service  -  Continue IV Heparain, Nitropaste for now.  Treat headache with PRN Tylenol.  -  Continue statin.    -  Will plan on LHC +/- PCI today.  Risks and benefits reviewed with patient and wife and he is willing to proceed.   Further recommendations based on outcomes.  - Hospitalist to address new DMII diagnosis, education.  Patient will need close follow up with PCP.        I discussed the patient's findings and my recommendations with the patient, any present family members, and the nursing staff.  Stu Garcias MD saw and examined patient, verified hx and PE, read all radiographic studies, reviewed labs and micro data, and formulated dx, plan for treatment and all medical decision making.      Jennifer Cruz PA-C, working with Stu Garcias MD  10/31/23 08:42 EDT

## 2023-10-31 NOTE — CONSULTS
"Diabetes Education  Assessment/Teaching    Patient Name:  Kong Murphy  YOB: 1970  MRN: [id number]  Admit Date:  10/30/2023      Assessment Date:  10/31/2023  Flowsheet Row Most Recent Value   General Information     Referral From: Other (comment)  [Newly Diagnosed]   Height 185.4 cm (73\")   Height Method Stated   Weight 100 kg (221 lb 4.8 oz)   Weight Method Bed scale   Pregnancy Assessment    Diabetes History    What type of diabetes do you have? Type 2   Length of Diabetes Diagnosis Newly diagnosed <6 months   Current DM knowledge poor   Have you had diabetes education/teaching in the past? no   Do you test your blood sugar at home? no   Education Preferences    What areas of diabetes would you like to learn about? understanding diabetes   Nutrition Information    Assessment Topics    Healthy Eating - Assessment Needs education   Problem Solving - Assessment Needs education   Reducing Risk - Assessment Needs education   Healthy Coping - Assessment Needs education   Monitoring - Assessment Needs education   DM Goals             Flowsheet Row Most Recent Value   DM Education Needs    Meter Needs meter   Blood Glucose Target Range Ranges per ADA guidelines   Problem Solving Hypoglycemia, Hyperglycemia, Signs, Symptoms, Treatment   Reducing Risks A1C testing, Other (comment), Blood pressure, Lipids, Cardiovascular  [Monitoring BG]   Physical Activity Walking   Physical Activity Frequency Occasionally   Healthy Coping Appropriate   Discharge Plan Home   Motivation Moderate   Teaching Method Explanation, Discussion, Handouts, Teach back   Patient Response Needs reinforcement        Mr. Murphy gave permission for diabetes education. He is newly diagnosed with T2DM with an A1c of 9.3%. Discussed and taught  about type 2 diabetes self-management, risk factors, and importance of blood glucose control to reduce complications. Target blood glucose readings and A1c goals per ADA were reviewed. Reviewed " what is an A1c and discussed the significance of his result. Signs, symptoms and treatment of hyperglycemia and hypoglycemia were discussed. He reports he has had polydipsia and polyuria. Lifestyle changes such as physical activity with MD approval and healthy eating were encouraged. Discussed that checking his BG regularly would provide him with immediate feedback about his BG level. It would allow him to assess how the foods he is eating, physical activity, and medications  are affecting his BG levels. Discussed keeping a log of his readings to take to his PCP at his follow up visit. He would need a prescription from his provider for the meter and supplies. Educator will reach out to his physician for order and instruct him in use if warranted. Discussed attending an OP diabetes class after discharge and he gave permission for our OP office to contact his PCP for an order. Instructed our office will contact him, once the order is obtained, to set up the appointment. Handouts provided: BH What is Diabetes?, BG goals, A1c goals, What Do I Do Now? Thank you for this referral.       Electronically signed by:  Corine Jacobo RN [facility]  10/31/23 15:00 EDT

## 2023-10-31 NOTE — CASE MANAGEMENT/SOCIAL WORK
Discharge Planning Assessment  [facility]     Patient Name: Kong Murphy  MRN: [id number]  Today's Date: 10/31/2023    Admit Date: 10/30/2023    Plan: Home at DC   Discharge Needs Assessment       Row Name 10/31/23 1055       Living Environment    People in Home spouse    Current Living Arrangements home       Discharge Needs Assessment    Equipment Currently Used at Home none    Equipment Needed After Discharge none    Discharge Coordination/Progress Denies current use of DME, HH or outpt services.                   Discharge Plan       Row Name 10/31/23 1056       Plan    Plan Home at DC    Patient/Family in Agreement with Plan yes    Plan Comments I spoke with the pt and his family. They deny any DC needs at this time.    Final Discharge Disposition Code 01 - home or self-care      Row Name 10/31/23 0851       Plan    Final Discharge Disposition Code 01 - home or self-care                  Continued Care and Services - Admitted Since 10/30/2023    Coordination has not been started for this encounter.       Expected Discharge Date and Time       Expected Discharge Date Expected Discharge Time    Nov 2, 2023            Demographic Summary    No documentation.                  Functional Status       Row Name 10/31/23 1054       Functional Status    Usual Activity Tolerance good       Functional Status, IADL    Medications independent    Meal Preparation independent    Housekeeping independent    Laundry independent    Shopping independent                   Psychosocial    No documentation.                  Abuse/Neglect    No documentation.                  Legal    No documentation.                  Substance Abuse    No documentation.                  Patient Forms    No documentation.                     Vangie Landis RN

## 2023-10-31 NOTE — PROGRESS NOTES
HEPARIN INFUSION  Kong Murphy is a  53 y.o. male receiving heparin infusion.     Therapy for (VTE/Cardiac): Cardiac (ACS)  Patient Weight: 99.8 kg  Initial Bolus (Y/N): Y  Any Bolus (Y/N): Y        Signs or Symptoms of Bleeding: None noted at present     Cardiac or Other (Not VTE)   Initial Bolus: 60 units/kg (Max 4,000 units)  Initial rate: 12 units/kg/hr (Max 1,000 units/hr)   Anti Xa Rebolus Infusion Hold time Change infusion Dose (Units/kg/hr) Next Anti Xa or aPTT Level Due   < 0.11 50 Units/kg  (4000 Units Max) None Increase by  3 Units/kg/hr 6 hours   0.11- 0.19 25 Units/kg  (2000 Units Max) None Increase by  2 Units/kg/hr 6 hours   0.2 - 0.29 0 None Increase by  1 Units/kg/hr 6 hours   0.3 - 0.5 0 None No Change 6 hours (after 2 consecutive levels in range check qAM)   0.51 - 0.6 0 None Decrease by  1 Units/kg/hr 6 hours   0.61 - 0.8 0 30 Minutes Decrease by  2 Units/kg/hr 6 hours   0.81 - 1 0 60 Minutes Decrease by  3 Units/kg/hr 6 hours   >1 0 Hold  After Anti Xa less than 0.5 decrease previous rate by  4 Units/kg/hr  Every 2 hours until Anti Xa  less than 0.5 then when infusion restarts in 6 hours     Results from last 7 days   Lab Units 10/30/23  1606   INR  1.00   HEMOGLOBIN g/dL 15.9   HEMATOCRIT % 45.9   PLATELETS 10*3/mm3 160       Date   Time   Anti-Xa Current Rate (Unit/kg/hr) Bolus   (Units) Rate Change   (Unit/kg/hr) New Rate (Unit/kg/hr) Next   Anti-Xa Comments  Pump Check Daily   10/30 1857 1.02 0 +4000 +10 10 0200 DW RN, Verified pump. Initial Xa hemolyzed, repeated and draw from line where Heparin is infusing. Patient has no history of Anticoagulation. Will continue     10/31 0808 0.10 10 4000 +3 13 1500 D/w Abby, RN Taylor Riedel-Rogers, PharmD, BCPS  10/31/2023  09:18 EDT

## 2023-10-31 NOTE — PROGRESS NOTES
[facility]  PROGRESS NOTE    Patient Name: Kong Murphy  : 1970  MRN: [id number]    Date of Admission: 10/30/2023  Primary Care Physician: Jose Alfredo Rodriguez MD    Subjective   Subjective     CC:  Chest pain    HPI:  Patient with convincing substernal chest pressure, better with nitro. Reports minimal flair ~ 0400 (1/10) but none now  New diagnosis of diabetes for him - hasn't been to PCP in some time but very willing to re-establish care with them. Wife taking notes      Objective   Objective     Vital Signs:   Temp:  [97.9 °F (36.6 °C)-98.1 °F (36.7 °C)] 97.9 °F (36.6 °C)  Heart Rate:  [61-75] 61  Resp:  [14-20] 14  BP: (118-172)/() 128/81     Physical Exam:  Constitutional: No acute distress, awake, alert  HENT: NCAT, mucous membranes moist  Respiratory: Clear to auscultation bilaterally, respiratory effort normal   Cardiovascular: Nitro paste on chest. RRR, no murmurs, rubs, or gallops  Gastrointestinal: Soft, nontender, nondistended  Musculoskeletal: Muscle tone within normal limits, no joint effusions appreciated  Psychiatric: Appropriate affect, cooperative  Neurologic: Alert and oriented, facial movements symmetric and spontaneous movement of all 4 extremities grossly equal bilaterally, speech clear  Skin: No rashes    Results Reviewed:  LAB RESULTS:      Lab 10/31/23  0808 10/30/23  2007 10/30/23  1606   WBC  --   --  6.98   HEMOGLOBIN  --   --  15.9   HEMATOCRIT  --   --  45.9   PLATELETS  --   --  160   NEUTROS ABS  --   --  4.33   IMMATURE GRANS (ABS)  --   --  0.02   LYMPHS ABS  --   --  1.82   MONOS ABS  --   --  0.71   EOS ABS  --   --  0.07   MCV  --   --  89.3   PROTIME  --   --  13.3   APTT  --   --  27.0*   HEPARIN ANTI-XA 0.10* 1.02*  --          Lab 10/31/23  0414 10/30/23  1606   SODIUM 139 134*   POTASSIUM 3.8 4.2   CHLORIDE 103 100   CO2 23.0 22.0   ANION GAP 13.0 12.0   BUN 14 15   CREATININE 0.99 1.14   EGFR 90.0 76.0   GLUCOSE  249* 368*   CALCIUM 8.9 9.4   MAGNESIUM 2.0  --    HEMOGLOBIN A1C  --  9.30*   TSH 2.470  --          Lab 10/30/23  1606   TOTAL PROTEIN 7.7   ALBUMIN 4.3   GLOBULIN 3.4   ALT (SGPT) 47*   AST (SGOT) 25   BILIRUBIN 0.5   ALK PHOS 82   LIPASE 54         Lab 10/31/23  0414 10/30/23  2207 10/30/23  1806 10/30/23  1606   PROBNP  --   --   --  <36.0   HSTROP T 330* 189* 49* 11   PROTIME  --   --   --  13.3   INR  --   --   --  1.00         Lab 10/31/23  0414   CHOLESTEROL 150   LDL CHOL 67   HDL CHOL 33*   TRIGLYCERIDES 313*             Brief Urine Lab Results  (Last result in the past 365 days)        Color   Clarity   Blood   Leuk Est   Nitrite   Protein   CREAT   Urine HCG        10/31/23 0218 Yellow   Clear   Negative   Negative   Negative   Negative                   Microbiology Results Abnormal       None            XR Chest 1 View    Result Date: 10/30/2023  XR CHEST 1 VW Date of Exam: 10/30/2023 2:51 PM CDT Indication: Chest Pain Triage Protocol Comparison: None available. Findings: Cardiomediastinal silhouette is unremarkable. There is elevation of the right hemidiaphragm. No airspace disease, pneumothorax, nor pleural effusion.No acute osseous abnormality identified.     Impression: Impression: No acute process identified Electronically Signed: Vernon Carvalho MD  10/30/2023 3:17 PM CDT  Workstation ID: [id number]         Current medications:  Scheduled Meds:insulin lispro, 2-7 Units, Subcutaneous, 4x Daily AC & at Bedtime  pharmacy consult - [place], , Does not apply, Daily  senna-docusate sodium, 2 tablet, Oral, BID  sertraline, 200 mg, Oral, Daily  sodium chloride, 10 mL, Intravenous, Q12H      Continuous Infusions:heparin, 13 Units/kg/hr, Last Rate: 13 Units/kg/hr (10/31/23 0933)  Pharmacy to Dose Heparin,   [START ON 11/1/2023] sodium chloride, 100 mL/hr      PRN Meds:.  acetaminophen    senna-docusate sodium **AND** polyethylene glycol **AND** bisacodyl **AND** bisacodyl    dextrose    dextrose    glucagon  (human recombinant)    ondansetron **OR** ondansetron    Pharmacy to Dose Heparin    sodium chloride    sodium chloride    sodium chloride    Assessment & Plan   Assessment & Plan     Active Hospital Problems    Diagnosis  POA    **Chest pain [R07.9]  Yes    Elevated troponin [R79.89]  Yes    Newly diagnosed diabetes [E11.9]  Yes    Anxiety [F41.9]  Yes    NSTEMI (non-ST elevated myocardial infarction) [I21.4]  Unknown      Resolved Hospital Problems   No resolved problems to display.        Brief Hospital Course to date:  Kong Murphy is a 53 y.o. male w new diagnosis DMII, family h/o CAD in younger adults who presented with acute chest pressure immediately after an OP stress test (reportedly low risk). Found to have escalating troponin    Type I NSTEMI  -assoc chest pain, no other trigger to cause uptrending significant troponin  -ASA, heparin gtt  -d/w cardiology: plans for C today    New diagnosis DMII, A1c 9%  -d/w patient and family recommendations for PCP re-establishment and further care  -nutrition consult for dietary recs  -metformin low dose start at dc, can escalate w PCP for goals    HyperTG - in setting of DMII + hyperglycemia. Would treat as above + statin per cards for above    Anxiety - zoloft    Expected Discharge Location and Transportation: home  Expected Discharge depending on [facility] results, likely 11/1  Expected Discharge Date: 11/1/2023; Expected Discharge Time:      DVT prophylaxis:  Medical DVT prophylaxis orders are present.     AM-PAC 6 Clicks Score (PT): 24 (10/30/23 2111)    CODE STATUS:   Code Status and Medical Interventions:   Ordered at: 10/30/23 1957     Code Status (Patient has no pulse and is not breathing):    CPR (Attempt to Resuscitate)     Medical Interventions (Patient has pulse or is breathing):    Full Support       Jayna Momin MD  10/31/23

## 2023-11-01 ENCOUNTER — APPOINTMENT (OUTPATIENT)
Dept: CARDIOLOGY | Facility: HOSPITAL | Age: 53
End: 2023-11-01
Payer: COMMERCIAL

## 2023-11-01 ENCOUNTER — READMISSION MANAGEMENT (OUTPATIENT)
Dept: CALL CENTER | Facility: HOSPITAL | Age: 53
End: 2023-11-01
Payer: COMMERCIAL

## 2023-11-01 VITALS
RESPIRATION RATE: 16 BRPM | DIASTOLIC BLOOD PRESSURE: 80 MMHG | SYSTOLIC BLOOD PRESSURE: 119 MMHG | HEIGHT: 73 IN | OXYGEN SATURATION: 97 % | BODY MASS INDEX: 29.22 KG/M2 | HEART RATE: 75 BPM | WEIGHT: 220.46 LBS | TEMPERATURE: 98.7 F

## 2023-11-01 LAB
ANION GAP SERPL CALCULATED.3IONS-SCNC: 9 MMOL/L (ref 5–15)
BH CV ECHO MEAS - AO MAX PG: 6.2 MMHG
BH CV ECHO MEAS - AO MEAN PG: 3.5 MMHG
BH CV ECHO MEAS - AO ROOT DIAM: 3.2 CM
BH CV ECHO MEAS - AO V2 MAX: 124.5 CM/SEC
BH CV ECHO MEAS - AO V2 VTI: 27.2 CM
BH CV ECHO MEAS - AVA(I,D): 2.43 CM2
BH CV ECHO MEAS - EDV(CUBED): 97.3 ML
BH CV ECHO MEAS - EDV(MOD-SP2): 113 ML
BH CV ECHO MEAS - EDV(MOD-SP4): 109 ML
BH CV ECHO MEAS - EF(MOD-BP): 51.9 %
BH CV ECHO MEAS - EF(MOD-SP2): 51.6 %
BH CV ECHO MEAS - EF(MOD-SP4): 53.9 %
BH CV ECHO MEAS - ESV(CUBED): 24.4 ML
BH CV ECHO MEAS - ESV(MOD-SP2): 54.7 ML
BH CV ECHO MEAS - ESV(MOD-SP4): 50.3 ML
BH CV ECHO MEAS - FS: 37 %
BH CV ECHO MEAS - IVS/LVPW: 1 CM
BH CV ECHO MEAS - IVSD: 1.2 CM
BH CV ECHO MEAS - LA DIMENSION: 3.1 CM
BH CV ECHO MEAS - LAT PEAK E' VEL: 10.3 CM/SEC
BH CV ECHO MEAS - LV MASS(C)D: 205 GRAMS
BH CV ECHO MEAS - LV MAX PG: 3.4 MMHG
BH CV ECHO MEAS - LV MEAN PG: 2 MMHG
BH CV ECHO MEAS - LV V1 MAX: 92 CM/SEC
BH CV ECHO MEAS - LV V1 VTI: 19.1 CM
BH CV ECHO MEAS - LVIDD: 4.6 CM
BH CV ECHO MEAS - LVIDS: 2.9 CM
BH CV ECHO MEAS - LVOT AREA: 3.5 CM2
BH CV ECHO MEAS - LVOT DIAM: 2.1 CM
BH CV ECHO MEAS - LVPWD: 1.2 CM
BH CV ECHO MEAS - MED PEAK E' VEL: 9.1 CM/SEC
BH CV ECHO MEAS - MR MAX PG: 12.7 MMHG
BH CV ECHO MEAS - MR MAX VEL: 178 CM/SEC
BH CV ECHO MEAS - MV A MAX VEL: 56 CM/SEC
BH CV ECHO MEAS - MV DEC SLOPE: 182 CM/SEC2
BH CV ECHO MEAS - MV DEC TIME: 0.19 SEC
BH CV ECHO MEAS - MV E MAX VEL: 54.5 CM/SEC
BH CV ECHO MEAS - MV E/A: 0.97
BH CV ECHO MEAS - MV MAX PG: 2.26 MMHG
BH CV ECHO MEAS - MV MEAN PG: 1 MMHG
BH CV ECHO MEAS - MV P1/2T: 103.3 MSEC
BH CV ECHO MEAS - MV V2 VTI: 24.3 CM
BH CV ECHO MEAS - MVA(P1/2T): 2.13 CM2
BH CV ECHO MEAS - MVA(VTI): 2.7 CM2
BH CV ECHO MEAS - PA ACC TIME: 0.12 SEC
BH CV ECHO MEAS - PA V2 MAX: 114 CM/SEC
BH CV ECHO MEAS - PI END-D VEL: 61 CM/SEC
BH CV ECHO MEAS - RAP SYSTOLE: 3 MMHG
BH CV ECHO MEAS - RVSP: 28 MMHG
BH CV ECHO MEAS - SV(LVOT): 66.2 ML
BH CV ECHO MEAS - SV(MOD-SP2): 58.3 ML
BH CV ECHO MEAS - SV(MOD-SP4): 58.7 ML
BH CV ECHO MEAS - TAPSE (>1.6): 2.15 CM
BH CV ECHO MEAS - TR MAX PG: 25 MMHG
BH CV ECHO MEAS - TR MAX VEL: 225.8 CM/SEC
BH CV ECHO MEASUREMENTS AVERAGE E/E' RATIO: 5.62
BH CV XLRA - RV BASE: 3.6 CM
BH CV XLRA - RV LENGTH: 7.6 CM
BH CV XLRA - RV MID: 3.1 CM
BH CV XLRA - TDI S': 10.8 CM/SEC
BUN SERPL-MCNC: 13 MG/DL (ref 6–20)
BUN/CREAT SERPL: 13.4 (ref 7–25)
CALCIUM SPEC-SCNC: 8.9 MG/DL (ref 8.6–10.5)
CHLORIDE SERPL-SCNC: 104 MMOL/L (ref 98–107)
CO2 SERPL-SCNC: 25 MMOL/L (ref 22–29)
CREAT SERPL-MCNC: 0.97 MG/DL (ref 0.76–1.27)
DEPRECATED RDW RBC AUTO: 42.5 FL (ref 37–54)
EGFRCR SERPLBLD CKD-EPI 2021: 92.2 ML/MIN/1.73
ERYTHROCYTE [DISTWIDTH] IN BLOOD BY AUTOMATED COUNT: 13.2 % (ref 12.3–15.4)
GLUCOSE BLDC GLUCOMTR-MCNC: 191 MG/DL (ref 70–130)
GLUCOSE SERPL-MCNC: 187 MG/DL (ref 65–99)
HCT VFR BLD AUTO: 43.4 % (ref 37.5–51)
HGB BLD-MCNC: 14.8 G/DL (ref 13–17.7)
LEFT ATRIUM VOLUME INDEX: 22.1 ML/M2
MCH RBC QN AUTO: 30 PG (ref 26.6–33)
MCHC RBC AUTO-ENTMCNC: 34.1 G/DL (ref 31.5–35.7)
MCV RBC AUTO: 88 FL (ref 79–97)
PLATELET # BLD AUTO: 146 10*3/MM3 (ref 140–450)
PMV BLD AUTO: 11.7 FL (ref 6–12)
POTASSIUM SERPL-SCNC: 4.4 MMOL/L (ref 3.5–5.2)
RBC # BLD AUTO: 4.93 10*6/MM3 (ref 4.14–5.8)
SODIUM SERPL-SCNC: 138 MMOL/L (ref 136–145)
WBC NRBC COR # BLD: 7.17 10*3/MM3 (ref 3.4–10.8)

## 2023-11-01 PROCEDURE — 63710000001 INSULIN LISPRO (HUMAN) PER 5 UNITS: Performed by: INTERNAL MEDICINE

## 2023-11-01 PROCEDURE — 85027 COMPLETE CBC AUTOMATED: CPT | Performed by: INTERNAL MEDICINE

## 2023-11-01 PROCEDURE — 82948 REAGENT STRIP/BLOOD GLUCOSE: CPT

## 2023-11-01 PROCEDURE — 93306 TTE W/DOPPLER COMPLETE: CPT

## 2023-11-01 PROCEDURE — 80048 BASIC METABOLIC PNL TOTAL CA: CPT | Performed by: INTERNAL MEDICINE

## 2023-11-01 RX ORDER — BISOPROLOL FUMARATE 5 MG/1
2.5 TABLET, FILM COATED ORAL
Status: DISCONTINUED | OUTPATIENT
Start: 2023-11-02 | End: 2023-11-01 | Stop reason: HOSPADM

## 2023-11-01 RX ORDER — LISINOPRIL 2.5 MG/1
2.5 TABLET ORAL
Qty: 30 TABLET | Refills: 11 | Status: SHIPPED | OUTPATIENT
Start: 2023-11-01

## 2023-11-01 RX ORDER — ASPIRIN 81 MG/1
81 TABLET ORAL DAILY
Qty: 90 TABLET | Refills: 3 | Status: SHIPPED | OUTPATIENT
Start: 2023-11-01

## 2023-11-01 RX ORDER — LISINOPRIL 2.5 MG/1
2.5 TABLET ORAL
Status: DISCONTINUED | OUTPATIENT
Start: 2023-11-01 | End: 2023-11-01 | Stop reason: HOSPADM

## 2023-11-01 RX ORDER — CLOPIDOGREL BISULFATE 75 MG/1
75 TABLET ORAL DAILY
Qty: 30 TABLET | Refills: 11 | Status: SHIPPED | OUTPATIENT
Start: 2023-11-01

## 2023-11-01 RX ORDER — BISOPROLOL FUMARATE 5 MG/1
2.5 TABLET, FILM COATED ORAL
Qty: 30 TABLET | Refills: 6 | Status: SHIPPED | OUTPATIENT
Start: 2023-11-02

## 2023-11-01 RX ORDER — NEBIVOLOL 2.5 MG/1
2.5 TABLET ORAL
Qty: 30 TABLET | Refills: 11 | Status: SHIPPED | OUTPATIENT
Start: 2023-11-01 | End: 2023-11-01 | Stop reason: HOSPADM

## 2023-11-01 RX ORDER — ROSUVASTATIN CALCIUM 40 MG/1
40 TABLET, COATED ORAL DAILY
Qty: 90 TABLET | Refills: 3 | Status: SHIPPED | OUTPATIENT
Start: 2023-11-01

## 2023-11-01 RX ADMIN — CLOPIDOGREL BISULFATE 75 MG: 75 TABLET ORAL at 08:53

## 2023-11-01 RX ADMIN — LISINOPRIL 2.5 MG: 2.5 TABLET ORAL at 08:53

## 2023-11-01 RX ADMIN — SENNOSIDES AND DOCUSATE SODIUM 2 TABLET: 8.6; 5 TABLET ORAL at 08:52

## 2023-11-01 RX ADMIN — SERTRALINE HYDROCHLORIDE 200 MG: 100 TABLET ORAL at 08:52

## 2023-11-01 RX ADMIN — NEBIVOLOL 2.5 MG: 2.5 TABLET ORAL at 08:52

## 2023-11-01 RX ADMIN — INSULIN LISPRO 2 UNITS: 100 INJECTION, SOLUTION INTRAVENOUS; SUBCUTANEOUS at 08:51

## 2023-11-01 RX ADMIN — ASPIRIN 81 MG: 81 TABLET, COATED ORAL at 08:52

## 2023-11-01 RX ADMIN — Medication 10 ML: at 08:54

## 2023-11-01 NOTE — DISCHARGE SUMMARY
[facility] Medicine Services  DISCHARGE SUMMARY    Patient Name: Kong Murphy  : 1970  MRN: [id number]    Date of Admission: 10/30/2023  5:22 PM  Date of Discharge:  2023  Primary Care Physician: Jose Alfredo Rodriguez MD    Consults       Date and Time Order Name Status Description    10/30/2023  9:05 PM Inpatient Cardiology Consult Completed             Hospital Course     Presenting Problem: chest pain    Active Hospital Problems    Diagnosis  POA    **Chest pain [R07.9]  Yes    Elevated troponin [R79.89]  Yes    Newly diagnosed diabetes [E11.9]  Yes    Anxiety [F41.9]  Yes    NSTEMI (non-ST elevated myocardial infarction) [I21.4]  Unknown      Resolved Hospital Problems   No resolved problems to display.          Hospital Course:  Kong Murphy is a 53 y.o. male w new diagnosis DMII, family h/o CAD in younger adults who presented with acute chest pressure immediately after an OP stress test (reportedly low risk). Found to have escalating troponin     Type I NSTEMI  -assoc chest pain, no other trigger to cause uptrending significant troponin  -ASA, heparin gtt started on admission   -d/w cardiology, s/p LHC with  EES to mid LAD.   -- continue with ASA/Plavix for one year, other cardiac meds as noted below  -- follow up with Cardiology as per their arrangements, repeat EKG at time of follow up      New diagnosis DMII, A1c 9%  -d/w patient and family recommendations for PCP re-establishment and further care  -nutrition consult for dietary recs  -metformin at d/c, can escalate w PCP for goals     HyperTG - in setting of DMII + hyperglycemia. Would treat as above + statin per cards for above     Anxiety - zoloft      Discharge Follow Up Recommendations for outpatient labs/diagnostics:   Follow up with PCP within one week  Follow up with Cardiology as per Dr. Garcias     Day of Discharge     HPI:   Doing well this am, denies any chest pain. Wife at bedside.     Review of  Systems  Gen- No fevers, chills  CV- No chest pain, palpitations  Resp- No cough, dyspnea  GI- No N/V/D, abd pain      Vital Signs:   Temp:  [97.8 °F (36.6 °C)-98.7 °F (37.1 °C)] 98.7 °F (37.1 °C)  Heart Rate:  [59-81] 75  Resp:  [16-18] 16  BP: (119-144)/(76-97) 119/80  Flow (L/min):  [2] 2      Physical Exam:  Constitutional: No acute distress, awake, alert  HENT: NCAT, mucous membranes moist  Respiratory: Clear to auscultation bilaterally, respiratory effort normal   Cardiovascular: RRR, no murmurs, rubs, or gallops  Gastrointestinal: Positive bowel sounds, soft, nontender, nondistended  Musculoskeletal: No bilateral ankle edema  Psychiatric: Appropriate affect, cooperative  Neurologic: Oriented x 3, strength symmetric in all extremities, Cranial Nerves grossly intact to confrontation, speech clear  Skin: No rashes      Pertinent  and/or Most Recent Results     LAB RESULTS:      Lab 11/01/23  0613 10/31/23  0808 10/30/23  2007 10/30/23  1606   WBC 7.17  --   --  6.98   HEMOGLOBIN 14.8  --   --  15.9   HEMATOCRIT 43.4  --   --  45.9   PLATELETS 146  --   --  160   NEUTROS ABS  --   --   --  4.33   IMMATURE GRANS (ABS)  --   --   --  0.02   LYMPHS ABS  --   --   --  1.82   MONOS ABS  --   --   --  0.71   EOS ABS  --   --   --  0.07   MCV 88.0  --   --  89.3   PROTIME  --   --   --  13.3   APTT  --   --   --  27.0*   HEPARIN ANTI-XA  --  0.10* 1.02*  --          Lab 11/01/23  0613 10/31/23  0414 10/30/23  1606   SODIUM 138 139 134*   POTASSIUM 4.4 3.8 4.2   CHLORIDE 104 103 100   CO2 25.0 23.0 22.0   ANION GAP 9.0 13.0 12.0   BUN 13 14 15   CREATININE 0.97 0.99 1.14   EGFR 92.2 90.0 76.0   GLUCOSE 187* 249* 368*   CALCIUM 8.9 8.9 9.4   MAGNESIUM  --  2.0  --    HEMOGLOBIN A1C  --   --  9.30*   TSH  --  2.470  --          Lab 10/30/23  1606   TOTAL PROTEIN 7.7   ALBUMIN 4.3   GLOBULIN 3.4   ALT (SGPT) 47*   AST (SGOT) 25   BILIRUBIN 0.5   ALK PHOS 82   LIPASE 54         Lab 10/31/23  0414 10/30/23  6789  10/30/23  1806 10/30/23  1606   PROBNP  --   --   --  <36.0   HSTROP T 330* 189* 49* 11   PROTIME  --   --   --  13.3   INR  --   --   --  1.00         Lab 10/31/23  0414   CHOLESTEROL 150   LDL CHOL 67   HDL CHOL 33*   TRIGLYCERIDES 313*             Brief Urine Lab Results  (Last result in the past 365 days)        Color   Clarity   Blood   Leuk Est   Nitrite   Protein   CREAT   Urine HCG        10/31/23 0218 Yellow   Clear   Negative   Negative   Negative   Negative                 Microbiology Results (last 10 days)       ** No results found for the last 240 hours. **            Cardiac Catheterization/Vascular Study    Result Date: 10/31/2023    95% mid LAD treated 3.5 x 12 Xience EES   Otherwise mild luminal irregularities   LVEF 60%     XR Chest 1 View    Result Date: 10/30/2023  XR CHEST 1 VW Date of Exam: 10/30/2023 2:51 PM CDT Indication: Chest Pain Triage Protocol Comparison: None available. Findings: Cardiomediastinal silhouette is unremarkable. There is elevation of the right hemidiaphragm. No airspace disease, pneumothorax, nor pleural effusion.No acute osseous abnormality identified.     Impression: No acute process identified Electronically Signed: Vernon Carvalho MD  10/30/2023 3:17 PM CDT  Workstation ID: [id number]                 Plan for Follow-up of Pending Labs/Results:     Discharge Details        Discharge Medications        New Medications        Instructions Start Date   aspirin 81 MG EC tablet  Notes to patient: For your heart    81 mg, Oral, Daily      bisoprolol 5 MG tablet  Commonly known as: ZEBeta   2.5 mg, Oral, Every 24 Hours Scheduled   Start Date: November 2, 2023     clopidogrel 75 MG tablet  Commonly known as: PLAVIX  Notes to patient: For your heart - antiplatelet    75 mg, Oral, Daily      lisinopril 2.5 MG tablet  Commonly known as: PRINIVIL,ZESTRIL  Notes to patient: For blood pressure    2.5 mg, Oral, Every 24 Hours Scheduled      metFORMIN 500 MG tablet  Commonly known as:  GLUCOPHAGE  Notes to patient: For diabetes    Take 1 tablet by mouth twice a day with breakfast and dinner.      pharmacy consult - MTM   Does not apply, Daily      rosuvastatin 40 MG tablet  Commonly known as: CRESTOR  Notes to patient: For cholesterol and to prevent plaques    40 mg, Oral, Daily             Continue These Medications        Instructions Start Date   sertraline 100 MG tablet  Commonly known as: ZOLOFT  Notes to patient: For mood    200 mg, Oral, Daily             Stop These Medications      pravastatin 40 MG tablet  Commonly known as: PRAVACHOL              No Known Allergies      Discharge Disposition:  Home or Self Care    Diet:  Hospital:  Diet Order   Procedures    Diet: Cardiac Diets; Healthy Heart (2-3 Na+); Texture: Regular Texture (IDDSI 7); Fluid Consistency: Thin (IDDSI 0)            Activity:      Restrictions or Other Recommendations:         CODE STATUS:    Code Status and Medical Interventions:   Ordered at: 10/30/23 1957     Code Status (Patient has no pulse and is not breathing):    CPR (Attempt to Resuscitate)     Medical Interventions (Patient has pulse or is breathing):    Full Support       No future appointments.    Additional Instructions for the Follow-ups that You Need to Schedule       Ambulatory Referral to Cardiac Rehab   As directed      Discharge Follow-up with PCP   As directed       Currently Documented PCP:    Jose Alfredo Rodriguez MD    PCP Phone Number:    [phone]     Follow Up Details: in one week with PCP                      Ayah Palmer MD  11/01/23      Time Spent on Discharge:  I spent  25  minutes on this discharge activity which included: face-to-face encounter with the patient, reviewing the data in the system, coordination of the care with the nursing staff as well as consultants, documentation, and entering orders.

## 2023-11-01 NOTE — PLAN OF CARE
Goal Outcome Evaluation:  Plan of Care Reviewed With: patient        Progress: improving          Patient alert and oriented x4. VSS on room air. No c/o chest pain. Sinus rhythm on  monitor. TR band removed, site CDI. No oozing or hematoma noted, gauze and tegaderm placed.

## 2023-11-01 NOTE — OUTREACH NOTE
Prep Survey      Flowsheet Row Responses   [facility] patient discharged from? [facility]   Is LACE score < 7 ? No   Eligibility Readm Mgmt   Discharge diagnosis NStemi   Does the patient have one of the following disease processes/diagnoses(primary or secondary)? Acute MI (STEMI,NSTEMI)   Does the patient have Home health ordered? No   Is there a DME ordered? No   Prep survey completed? Yes            RAND BOLES - Registered Nurse

## 2023-11-01 NOTE — PROGRESS NOTES
" [facility] - Progress Note    Kong Murphy  1970  S528/1    11/01/23, 07:50 EDT      Chief Complaint: Following for  NSTEMI, CAD    Subjective:   S/P C 10/31 with PCI mid LAD.  Doing well, no chest pain or discomfort.  No dyspnea.      Review of Systems:  Pertinent positives are listed above and in physical exam.  All others have been reviewed and are negative.    aspirin, 81 mg, Oral, Daily  clopidogrel, 75 mg, Oral, Daily  insulin lispro, 2-7 Units, Subcutaneous, 4x Daily AC & at Bedtime  nebivolol, 2.5 mg, Oral, Q24H  pharmacy consult - MTM, , Does not apply, Daily  rosuvastatin, 20 mg, Oral, Nightly  senna-docusate sodium, 2 tablet, Oral, BID  sertraline, 200 mg, Oral, Daily  sodium chloride, 10 mL, Intravenous, Q12H        Objective:  Vitals:   height is 185.4 cm (73\") and weight is 100 kg (221 lb 4.8 oz). His oral temperature is 98.5 °F (36.9 °C). His blood pressure is 134/82 and his pulse is 59. His respiration is 16 and oxygen saturation is 94%.   No intake or output data in the 24 hours ending 11/01/23 0750    Physical Exam:  General:  WN, NAD, A and O x3.  CV:  Normal S1,S2. No murmur, rub, or gallop.  Resp:  CTA Adonis, equal, nonlabored.  Abd:  Soft, + BS, no organomegaly. Nontender to palpation.  Extrem:  No edema BLE, 2+ pedal/PT pulses.            Results from last 7 days   Lab Units 11/01/23  0613   WBC 10*3/mm3 7.17   HEMOGLOBIN g/dL 14.8   HEMATOCRIT % 43.4   PLATELETS 10*3/mm3 146     Results from last 7 days   Lab Units 11/01/23  0613 10/31/23  0414 10/30/23  1606   SODIUM mmol/L 138   < > 134*   POTASSIUM mmol/L 4.4   < > 4.2   CHLORIDE mmol/L 104   < > 100   CO2 mmol/L 25.0   < > 22.0   BUN mg/dL 13   < > 15   CREATININE mg/dL 0.97   < > 1.14   CALCIUM mg/dL 8.9   < > 9.4   BILIRUBIN mg/dL  --   --  0.5   ALK PHOS U/L  --   --  82   ALT (SGPT) U/L  --   --  47*   AST (SGOT) U/L  --   --  25   GLUCOSE mg/dL 187*   < > 368*    < > = values in this interval not displayed. "     Results from last 7 days   Lab Units 10/30/23  1606   INR  1.00     Results from last 7 days   Lab Units 10/31/23  0414   TSH uIU/mL 2.470     Results from last 7 days   Lab Units 10/31/23  0414   CHOLESTEROL mg/dL 150   TRIGLYCERIDES mg/dL 313*   HDL CHOL mg/dL 33*   LDL CHOL mg/dL 67     Results from last 7 days   Lab Units 10/30/23  1606   PROBNP pg/mL <36.0       Tele:  NSR    ASSESSMENT:  -  54 yo CM who underwent nuclear MPS yesterday in office presents to [facility] ED with chest pain ~1 hour after undergoing exercise stress test in office.  Labs consistent with NSTEMI.   -  HLP, on statin  -  Hypertriglyceridemia  -  Newly diagnosed DMII, A1C 9.3  -  Anxiety           PLAN:  -  Admitted to hospitalist service  - Hospitalist to address new DMII diagnosis, education.  Patient will need close follow up with PCP.  -  Continue DAPT, Nebivolol.  Increase Crestor to 40mg QHS.  Start low dose ACEI, Lisinopril 2.5mg PO daily.  -  Echo pending, will review.  -  Cardiac Rehab referral made.  -  Okay for discharge from cardiology standpoint.  Follow up with us 1 month with EKG.  Recommend DC home on the following cardiac medications:  ASA 81mg PO daily, Plavix 75 mg PO daily, Crestor 40mg QHS, Bystolic 2.5mg PO daily, Lisinopril 2.5mg PO daily.      I discussed the patient's findings and my recommendations with the patient, any present family members, and the nursing staff.  Stu Garcias MD saw and examined patient, verified hx and PE, read all radiographic studies, reviewed labs and micro data, and formulated dx, plan for treatment and all medical decision making.      Jennifer Cruz PA-C  11/01/23, 07:50 EDT

## 2023-11-01 NOTE — PROGRESS NOTES
Pt. Qualifies for Phase II Cardiac Rehab. Staff discussed benefits of exercise, program protocol, and educational material provided. Teach back verified.  Patient refused Cardiac Rehab at time of visit. Patient was given  [facility] CR brochure and was told to call the office if they change their mind. Staff also gave patient a home exercise guideline sheet.

## 2023-11-01 NOTE — PLAN OF CARE
Problem: Adult Inpatient Plan of Care  Goal: Plan of Care Review  Outcome: Adequate for Care Transition  Goal: Patient-Specific Goal (Individualized)  Outcome: Adequate for Care Transition  Goal: Absence of Hospital-Acquired Illness or Injury  Outcome: Adequate for Care Transition  Intervention: Identify and Manage Fall Risk  Recent Flowsheet Documentation  Taken 11/1/2023 0800 by Rama Martines RN  Safety Promotion/Fall Prevention: safety round/check completed  Intervention: Prevent Skin Injury  Recent Flowsheet Documentation  Taken 11/1/2023 0800 by Rama Martines RN  Body Position: position changed independently  Intervention: Prevent and Manage VTE (Venous Thromboembolism) Risk  Recent Flowsheet Documentation  Taken 11/1/2023 0800 by Rama Martines RN  Activity Management: up ad [place]  Intervention: Prevent Infection  Recent Flowsheet Documentation  Taken 11/1/2023 0800 by Rama Martines RN  Infection Prevention: hand hygiene promoted  Goal: Optimal Comfort and Wellbeing  Outcome: Adequate for Care Transition  Goal: Readiness for Transition of Care  Outcome: Adequate for Care Transition     Problem: Chest Pain  Goal: Resolution of Chest Pain Symptoms  Outcome: Adequate for Care Transition   Goal Outcome Evaluation:

## 2023-11-03 ENCOUNTER — READMISSION MANAGEMENT (OUTPATIENT)
Dept: CALL CENTER | Facility: HOSPITAL | Age: 53
End: 2023-11-03
Payer: COMMERCIAL

## 2023-11-03 NOTE — OUTREACH NOTE
AMI Week 1 Survey      Flowsheet Row Responses   [facility] patient discharged from? [place]   Does the patient have one of the following disease processes/diagnoses(primary or secondary)? Acute MI (STEMI,NSTEMI)   Week 1 attempt successful? Yes   Call start time [phone]   Call end time 1738   Discharge diagnosis NStemi   Person spoke with today (if not patient) and relationship patient   Meds reviewed with patient/caregiver? Yes   Is the patient having any side effects they believe may be caused by any medication additions or changes? No   Does the patient have all prescriptions related to this admission filled (includes statins,anticoagulants,HTN meds,anti-arrhythmia meds) Yes   Is the patient taking all medications as directed (includes completed medication regime)? Yes   Does the patient have a primary care provider?  Yes   Does the patient have an appointment with their PCP,cardiologist,or clinic within 7 days of discharge? Yes   Comments regarding PCP Patient seen by PCP today   Has the patient kept scheduled appointments due by today? Yes   Psychosocial issues? No   Did the patient receive a copy of their discharge instructions? Yes   Nursing interventions Assisted with MyChart setup, Reviewed instructions with patient   What is the patient's perception of their health status since discharge? Improving   Nursing interventions Nurse provided patient education   Is the patient/caregiver able to teach back signs and symptoms of when to call for help immediately: Sudden chest discomfort, Shortness of breath at any time, Sudden discomfort in arms, back, neck or jaw   Nursing interventions Nurse provided patient education   Is the patient/caregiver able to teach back ways to prevent a second heart attack: Take medications, Follow up with MD, Manage risk factors   If the patient is a current smoker, are they able to teach back resources for cessation? Not a smoker   Is the patient/caregiver able to teach back  the hierarchy of who to call/visit for symptoms/problems? PCP, Specialist, Home health nurse, Urgent Care, ED, 911 Yes   Week 1 call completed? Yes   Revoked No further contact(revokes)-requires comment   Is the patient interested in additional calls from an ambulatory ? No   Would this patient benefit from a Referral to Amb Social Work? No   Graduated/Revoked comments Doing well.   Call end time [id number]            D[employer]ak CUEVAS - Registered Nurse

## 2024-10-22 RX ORDER — ASPIRIN 81 MG/1
81 TABLET ORAL DAILY
Qty: 90 TABLET | Refills: 3 | OUTPATIENT
Start: 2024-10-22

## 2024-10-22 RX ORDER — ROSUVASTATIN CALCIUM 40 MG/1
40 TABLET, COATED ORAL DAILY
Qty: 90 TABLET | Refills: 3 | OUTPATIENT
Start: 2024-10-22

## 2024-10-22 RX ORDER — CLOPIDOGREL BISULFATE 75 MG/1
75 TABLET ORAL DAILY
Qty: 30 TABLET | Refills: 11 | OUTPATIENT
Start: 2024-10-22

## 2025-02-05 ENCOUNTER — HOSPITAL ENCOUNTER (EMERGENCY)
Facility: HOSPITAL | Age: 55
Discharge: HOME | End: 2025-02-05
Attending: EMERGENCY MEDICINE
Payer: COMMERCIAL

## 2025-02-05 VITALS
HEART RATE: 72 BPM | SYSTOLIC BLOOD PRESSURE: 137 MMHG | OXYGEN SATURATION: 96 % | BODY MASS INDEX: 28.25 KG/M2 | TEMPERATURE: 98.8 F | RESPIRATION RATE: 20 BRPM | DIASTOLIC BLOOD PRESSURE: 83 MMHG | WEIGHT: 213.19 LBS | HEIGHT: 73 IN

## 2025-02-05 DIAGNOSIS — M79.604 RIGHT LEG PAIN: ICD-10-CM

## 2025-02-05 DIAGNOSIS — M79.89 RIGHT LEG SWELLING: ICD-10-CM

## 2025-02-05 DIAGNOSIS — I80.01 THROMBOPHLEBITIS OF SUPERFICIAL VEINS OF RIGHT LOWER EXTREMITY: Primary | ICD-10-CM

## 2025-02-05 DIAGNOSIS — Z86.79 HISTORY OF CORONARY ARTERY DISEASE: ICD-10-CM

## 2025-02-05 LAB — D DIMER PPP FEU-MCNC: <0.27 MCGFEU/ML (ref 0–0.56)

## 2025-02-05 PROCEDURE — 85379 FIBRIN DEGRADATION QUANT: CPT | Performed by: PHYSICIAN ASSISTANT

## 2025-02-05 PROCEDURE — 36415 COLL VENOUS BLD VENIPUNCTURE: CPT

## 2025-02-05 PROCEDURE — 99283 EMERGENCY DEPT VISIT LOW MDM: CPT

## 2025-02-06 NOTE — DISCHARGE INSTRUCTIONS
Symptomatic care is recommended.  Take all medications as prescribed and instructed. Follow up with primary care as directed or return to Emergency Department with worsening of symptoms.

## 2025-02-10 NOTE — ED PROVIDER NOTES
EMERGENCY DEPARTMENT ENCOUNTER    Pt Name: Kong Murphy  MRN: [id number]  Pt :   1970  Room Number:  VR01/V1  Date of encounter:  2025  PCP: Jose Alfredo Rodriguez MD  ED Provider: WILLOW Colunga    Historian: Patient    HPI:  Chief Complaint: Right Leg Pain and swelling    Context: Kong Murphy is a 54 y.o. male who presents to the ED c/o right leg pain and swelling. The individual reports sudden onset of pain and swelling in the ankle, with no known injury or trauma. The discomfort began while in the kitchen and is exacerbated by bending the ankle. The individual is currently taking Plavix (clopidogrel) due to a heart attack in 2023 and is not on any additional blood thinners. No other symptoms or recent changes in health are noted.     HPI     REVIEW OF SYSTEMS  A chief complaint appropriate review of systems was completed and is negative except as noted in the HPI.     PAST MEDICAL HISTORY  Past Medical History:   Diagnosis Date    HLD (hyperlipidemia)     Kidney stones     Leg pain     Right leg    Mood disorder     Osteoarthritis        PAST SURGICAL HISTORY  Past Surgical History:   Procedure Laterality Date    CARDIAC CATHETERIZATION N/A 10/31/2023    Procedure: Left Heart Cath;  Surgeon: Addison Graff MD;  Location:  [facility];  Service: Cardiovascular;  Laterality: N/A;    CARDIAC CATHETERIZATION N/A 10/31/2023    Procedure: Stent CANDY coronary;  Surgeon: Addison Graff MD;  Location:  [facility];  Service: Cardiovascular;  Laterality: N/A;    CHOLECYSTECTOMY  2015    VASECTOMY         FAMILY HISTORY  Family History   Problem Relation Age of Onset    Cancer Mother     Heart disease Father     Heart attack Father     Heart disease Brother     Heart attack Brother     Diabetes Other        SOCIAL HISTORY  Social History     Socioeconomic History    Marital status:    Tobacco Use    Smoking status: Never    Smokeless tobacco:  Never   Vaping Use    Vaping status: Never Used   Substance and Sexual Activity    Alcohol use: Yes     Comment: Rarely    Drug use: Never    Sexual activity: Defer       ALLERGIES  Patient has no known allergies.    PHYSICAL EXAM  Physical Exam  Vitals and nursing note reviewed.   Constitutional:       General: He is not in acute distress.     Appearance: Normal appearance. He is not ill-appearing, toxic-appearing or diaphoretic.   HENT:      Head: Normocephalic and atraumatic.      Nose: Nose normal.      Mouth/Throat:      Mouth: Mucous membranes are moist.   Eyes:      Extraocular Movements: Extraocular movements intact.   Cardiovascular:      Rate and Rhythm: Normal rate and regular rhythm.      Pulses: Normal pulses.           Dorsalis pedis pulses are detected w/ Doppler on the right side.        Posterior tibial pulses are detected w/ Doppler on the right side.   Pulmonary:      Effort: Pulmonary effort is normal.   Musculoskeletal:         General: Swelling and tenderness present. Normal range of motion.      Cervical back: Normal range of motion.        Legs:    Skin:     General: Skin is warm and dry.   Neurological:      General: No focal deficit present.      Mental Status: He is alert.   Psychiatric:         Mood and Affect: Mood normal.         Behavior: Behavior normal.           LAB RESULTS  Results for orders placed or performed during the hospital encounter of 02/05/25   D-dimer, Quantitative    Collection Time: 02/05/25  8:31 PM    Specimen: Blood   Result Value Ref Range    D-Dimer, Quantitative <0.27 0.00 - 0.56 MCGFEU/mL       If labs were ordered, I independently reviewed the results and considered them in treating the patient.    RADIOLOGY  No orders to display     [] Radiologist's Report Reviewed:  I ordered and independently interpreted the above noted radiographic studies.  See radiologist's dictation for official interpretation.      PROCEDURES    Procedures    No orders to display        MEDICATIONS GIVEN IN ER    Medications - No data to display    MEDICAL DECISION MAKING, PROGRESS, and CONSULTS   Medical Decision Making  This is a 54-year-old male with history of coronary artery disease and recent NSTEMI who presents the ER with an acute onset of right lower extremity pain and swelling that started today while he was in the kitchen.  No acute or emergent findings on physical exam.  No signs or symptoms consistent with infection. No recent history of trauma. Strong dorsalis pedis and posterior tibialis pulses observed via Doppler on physical exam.  Negative D-dimer. Based on clinical presentation and workup in ED, no clear indication for treatment beyond symptomatic management. Patient instructed on continued symptomatic care and close outpatient follow-up to primary care.  Return precautions provided.    Problems Addressed:  History of coronary artery disease: chronic illness or injury  Right leg pain: acute illness or injury  Right leg swelling: acute illness or injury  Thrombophlebitis of superficial veins of right lower extremity: acute illness or injury    Amount and/or Complexity of Data Reviewed  Independent Historian: spouse  Labs: ordered. Decision-making details documented in ED Course.      Discussion below represents my analysis of pertinent findings related to patient's condition, differential diagnosis, treatment plan and final disposition.    Differential diagnosis: The primary assessment is to rule out a deep vein thrombosis (DVT) due to the sudden onset of ankle pain and swelling without trauma, which could indicate a possible blood clot. The presence of a possible superficial vein rupture was noted, but the primary concern remains the exclusion of DVT. Additional differential diagnosis include but are not limited to: Venous insufficiency, cellulitis, stasis dermatitis, DVT, thrombophlebitis, muscular or tendon injury. .     Additional sources  Discussed/ obtained information  from independent historians:   [x] Spouse  [] Parent  [] Family member  [] Friend  [] EMS   [] Other:  External (non-ED) record review:   [x] Inpatient record: Personally reviewed patient inpatient admission from 10/30/2024 where patient was admitted for NSTEMI and underwent left heart cath with Dr. Graff.    [] Office record:   [] Outpatient record:   [] Prior Outpatient labs:   [] Prior Outpatient radiology:   [] Primary Care record:   [] Outside ED record:   [] Other:   Patient's care impacted by:   [x] Diabetes  [x] Hypertension  [x] Hyperlipidemia  [] Hypothyroidism   [x] Coronary Artery Disease  [] Congestive Heart Failure   [] COPD   [] Cancer   [] Obesity  [] GERD   [] Tobacco Abuse   [] Substance Abuse    [x] Anxiety   [] Depression   [] Other:   Care significantly affected by Social Determinants of Health (housing and economic circumstances, unemployment)    [] Yes     [x] No   If yes, Patient's care significantly limited by  Social Determinants of Health including:   [] Inadequate housing   [] Low income   [] Alcoholism and drug addiction in family   [] Problems related to primary support group   [] Unemployment   [] Problems related to employment   [] Other Social Determinants of Health:   Orders placed during this visit:  Orders Placed This Encounter   Procedures    D-dimer, Quantitative     ED Course:    ED Course as of 02/10/25 0950   Wed Feb 05, 2025 2029 Vitals and Telemetry tracing was reviewed and directly interpreted by myself demonstrating blood pressure 137/83, temperature 98.8 °F, heart rate of 72, respirations 20 breaths/min oxygen saturation 96% on room air [JG]   2029 BP: 137/83 [JG]   2029 Temp: 98.8 °F (37.1 °C) [JG]   2029 Heart Rate: 72 [JG]   2029 Resp: 20 [JG]   2029 SpO2: 96 % [JG]   2215 Labs studies were reviewed and directly interpreted by myself and demonstrated no acute or emergent abnormalities; d-dimer within normal limits.  [JG]   2215 D-Dimer, Quant: <0.27 [JG]      ED  Course User Index  [JG] Avtar Baldwin PA          DIAGNOSIS  Final diagnoses:   Thrombophlebitis of superficial veins of right lower extremity   Right leg pain   Right leg swelling   History of coronary artery disease     DISPOSITION    ED Disposition       ED Disposition   Discharge    Condition   Stable    Comment   --           DISCHARGE    Patient discharged in stable condition.    Reviewed implications of results, diagnosis, meds, responsibility to follow up, warning signs and symptoms of possible worsening, potential complications and reasons to return to ER.    Patient/Family voiced understanding of above instructions.    Discussed plan for discharge, as there is no emergent indication for admission.  Pt/family is agreeable and understands need for follow up and possible repeat testing.  Pt/family is aware that discharge does not mean that nothing is wrong but that it indicates no emergency is currently present that requires admission and they must continue care with follow-up as given below or with a physician of their choice.     FOLLOW-UP  Jose Alfredo Rodriguez MD  [address] [address]  [phone]    Call   Call for follow up with primary care         Medication List      No changes were made to your prescriptions during this visit.          Please note that portions of this document were completed with voice recognition software.        Avtar Baldwin PA  02/10/25 [id number]

## 2025-03-13 ENCOUNTER — TRANSCRIBE ORDERS (OUTPATIENT)
Dept: GENERAL RADIOLOGY | Facility: HOSPITAL | Age: 55
End: 2025-03-13
Payer: COMMERCIAL

## 2025-03-13 DIAGNOSIS — M76.62 LEFT ACHILLES TENDINITIS: Primary | ICD-10-CM

## 2025-05-12 ENCOUNTER — APPOINTMENT (OUTPATIENT)
Dept: CT IMAGING | Facility: HOSPITAL | Age: 55
End: 2025-05-12
Payer: COMMERCIAL

## 2025-05-12 ENCOUNTER — HOSPITAL ENCOUNTER (EMERGENCY)
Facility: HOSPITAL | Age: 55
Discharge: HOME | End: 2025-05-12
Attending: EMERGENCY MEDICINE | Admitting: EMERGENCY MEDICINE
Payer: COMMERCIAL

## 2025-05-12 VITALS
BODY MASS INDEX: 26.9 KG/M2 | WEIGHT: 203 LBS | HEIGHT: 73 IN | TEMPERATURE: 98.5 F | RESPIRATION RATE: 16 BRPM | SYSTOLIC BLOOD PRESSURE: 115 MMHG | HEART RATE: 81 BPM | OXYGEN SATURATION: 98 % | DIASTOLIC BLOOD PRESSURE: 79 MMHG

## 2025-05-12 DIAGNOSIS — N20.1 LEFT URETERAL STONE: Primary | ICD-10-CM

## 2025-05-12 DIAGNOSIS — N17.9 AKI (ACUTE KIDNEY INJURY): ICD-10-CM

## 2025-05-12 DIAGNOSIS — N23 RENAL COLIC ON LEFT SIDE: ICD-10-CM

## 2025-05-12 DIAGNOSIS — R11.0 NAUSEA: ICD-10-CM

## 2025-05-12 LAB
ALBUMIN SERPL-MCNC: 4.2 G/DL (ref 3.5–5.2)
ALBUMIN/GLOB SERPL: 1.5 G/DL
ALP SERPL-CCNC: 61 U/L (ref 39–117)
ALT SERPL W P-5'-P-CCNC: 23 U/L (ref 1–41)
ANION GAP SERPL CALCULATED.3IONS-SCNC: 10 MMOL/L (ref 5–15)
AST SERPL-CCNC: 18 U/L (ref 1–40)
BASOPHILS # BLD AUTO: 0.03 10*3/MM3 (ref 0–0.2)
BASOPHILS NFR BLD AUTO: 0.3 % (ref 0–1.5)
BILIRUB SERPL-MCNC: 0.4 MG/DL (ref 0–1.2)
BILIRUB UR QL STRIP: NEGATIVE
BUN SERPL-MCNC: 15 MG/DL (ref 6–20)
BUN/CREAT SERPL: 8.2 (ref 7–25)
CALCIUM SPEC-SCNC: 9 MG/DL (ref 8.6–10.5)
CHLORIDE SERPL-SCNC: 105 MMOL/L (ref 98–107)
CLARITY UR: CLEAR
CO2 SERPL-SCNC: 24 MMOL/L (ref 22–29)
COLOR UR: YELLOW
CREAT SERPL-MCNC: 1.84 MG/DL (ref 0.76–1.27)
D-LACTATE SERPL-SCNC: 0.8 MMOL/L (ref 0.5–2)
DEPRECATED RDW RBC AUTO: 46 FL (ref 37–54)
EGFRCR SERPLBLD CKD-EPI 2021: 42.5 ML/MIN/1.73
EOSINOPHIL # BLD AUTO: 0.06 10*3/MM3 (ref 0–0.4)
EOSINOPHIL NFR BLD AUTO: 0.5 % (ref 0.3–6.2)
ERYTHROCYTE [DISTWIDTH] IN BLOOD BY AUTOMATED COUNT: 14.1 % (ref 12.3–15.4)
GLOBULIN UR ELPH-MCNC: 2.8 GM/DL
GLUCOSE SERPL-MCNC: 109 MG/DL (ref 65–99)
GLUCOSE UR STRIP-MCNC: NEGATIVE MG/DL
HCT VFR BLD AUTO: 42.6 % (ref 37.5–51)
HGB BLD-MCNC: 14 G/DL (ref 13–17.7)
HGB UR QL STRIP.AUTO: NEGATIVE
HOLD SPECIMEN: NORMAL
IMM GRANULOCYTES # BLD AUTO: 0.03 10*3/MM3 (ref 0–0.05)
IMM GRANULOCYTES NFR BLD AUTO: 0.3 % (ref 0–0.5)
KETONES UR QL STRIP: NEGATIVE
LEUKOCYTE ESTERASE UR QL STRIP.AUTO: NEGATIVE
LIPASE SERPL-CCNC: 37 U/L (ref 13–60)
LYMPHOCYTES # BLD AUTO: 1.26 10*3/MM3 (ref 0.7–3.1)
LYMPHOCYTES NFR BLD AUTO: 10.6 % (ref 19.6–45.3)
MCH RBC QN AUTO: 29.2 PG (ref 26.6–33)
MCHC RBC AUTO-ENTMCNC: 32.9 G/DL (ref 31.5–35.7)
MCV RBC AUTO: 88.8 FL (ref 79–97)
MONOCYTES # BLD AUTO: 1.42 10*3/MM3 (ref 0.1–0.9)
MONOCYTES NFR BLD AUTO: 11.9 % (ref 5–12)
NEUTROPHILS NFR BLD AUTO: 76.4 % (ref 42.7–76)
NEUTROPHILS NFR BLD AUTO: 9.1 10*3/MM3 (ref 1.7–7)
NITRITE UR QL STRIP: NEGATIVE
NRBC BLD AUTO-RTO: 0 /100 WBC (ref 0–0.2)
PH UR STRIP.AUTO: 5.5 [PH] (ref 5–8)
PLATELET # BLD AUTO: 158 10*3/MM3 (ref 140–450)
PMV BLD AUTO: 11.4 FL (ref 6–12)
POTASSIUM SERPL-SCNC: 4.4 MMOL/L (ref 3.5–5.2)
PROT SERPL-MCNC: 7 G/DL (ref 6–8.5)
PROT UR QL STRIP: NEGATIVE
RBC # BLD AUTO: 4.8 10*6/MM3 (ref 4.14–5.8)
SODIUM SERPL-SCNC: 139 MMOL/L (ref 136–145)
SP GR UR STRIP: 1.02 (ref 1–1.03)
UROBILINOGEN UR QL STRIP: NORMAL
WBC NRBC COR # BLD AUTO: 11.9 10*3/MM3 (ref 3.4–10.8)
WHOLE BLOOD HOLD COAG: NORMAL
WHOLE BLOOD HOLD SPECIMEN: NORMAL

## 2025-05-12 PROCEDURE — 80053 COMPREHEN METABOLIC PANEL: CPT | Performed by: PHYSICIAN ASSISTANT

## 2025-05-12 PROCEDURE — 85025 COMPLETE CBC W/AUTO DIFF WBC: CPT | Performed by: PHYSICIAN ASSISTANT

## 2025-05-12 PROCEDURE — 83690 ASSAY OF LIPASE: CPT | Performed by: PHYSICIAN ASSISTANT

## 2025-05-12 PROCEDURE — 25010000002 ONDANSETRON PER 1 MG: Performed by: EMERGENCY MEDICINE

## 2025-05-12 PROCEDURE — 96374 THER/PROPH/DIAG INJ IV PUSH: CPT

## 2025-05-12 PROCEDURE — 83605 ASSAY OF LACTIC ACID: CPT | Performed by: PHYSICIAN ASSISTANT

## 2025-05-12 PROCEDURE — 25810000003 SODIUM CHLORIDE 0.9 % SOLUTION: Performed by: EMERGENCY MEDICINE

## 2025-05-12 PROCEDURE — 99284 EMERGENCY DEPT VISIT MOD MDM: CPT

## 2025-05-12 PROCEDURE — 25010000002 KETOROLAC TROMETHAMINE PER 15 MG: Performed by: EMERGENCY MEDICINE

## 2025-05-12 PROCEDURE — 25010000002 MORPHINE PER 10 MG: Performed by: EMERGENCY MEDICINE

## 2025-05-12 PROCEDURE — 81003 URINALYSIS AUTO W/O SCOPE: CPT | Performed by: PHYSICIAN ASSISTANT

## 2025-05-12 PROCEDURE — 96375 TX/PRO/DX INJ NEW DRUG ADDON: CPT

## 2025-05-12 PROCEDURE — 74176 CT ABD & PELVIS W/O CONTRAST: CPT

## 2025-05-12 RX ORDER — ONDANSETRON 2 MG/ML
4 INJECTION INTRAMUSCULAR; INTRAVENOUS ONCE
Status: COMPLETED | OUTPATIENT
Start: 2025-05-12 | End: 2025-05-12

## 2025-05-12 RX ORDER — ONDANSETRON 4 MG/1
4 TABLET, ORALLY DISINTEGRATING ORAL EVERY 6 HOURS PRN
Qty: 15 TABLET | Refills: 0 | Status: SHIPPED | OUTPATIENT
Start: 2025-05-12

## 2025-05-12 RX ORDER — KETOROLAC TROMETHAMINE 15 MG/ML
15 INJECTION, SOLUTION INTRAMUSCULAR; INTRAVENOUS ONCE
Status: COMPLETED | OUTPATIENT
Start: 2025-05-12 | End: 2025-05-12

## 2025-05-12 RX ORDER — OXYCODONE AND ACETAMINOPHEN 7.5; 325 MG/1; MG/1
1 TABLET ORAL EVERY 6 HOURS PRN
Qty: 15 TABLET | Refills: 0 | Status: SHIPPED | OUTPATIENT
Start: 2025-05-12

## 2025-05-12 RX ORDER — OXYCODONE AND ACETAMINOPHEN 7.5; 325 MG/1; MG/1
1 TABLET ORAL ONCE
Refills: 0 | Status: COMPLETED | OUTPATIENT
Start: 2025-05-12 | End: 2025-05-12

## 2025-05-12 RX ORDER — MORPHINE SULFATE 4 MG/ML
4 INJECTION, SOLUTION INTRAMUSCULAR; INTRAVENOUS ONCE
Status: COMPLETED | OUTPATIENT
Start: 2025-05-12 | End: 2025-05-12

## 2025-05-12 RX ORDER — TAMSULOSIN HYDROCHLORIDE 0.4 MG/1
1 CAPSULE ORAL DAILY
Qty: 30 CAPSULE | Refills: 0 | Status: SHIPPED | OUTPATIENT
Start: 2025-05-12

## 2025-05-12 RX ORDER — SODIUM CHLORIDE 0.9 % (FLUSH) 0.9 %
10 SYRINGE (ML) INJECTION AS NEEDED
Status: DISCONTINUED | OUTPATIENT
Start: 2025-05-12 | End: 2025-05-12 | Stop reason: HOSPADM

## 2025-05-12 RX ADMIN — OXYCODONE AND ACETAMINOPHEN 1 TABLET: 7.5; 325 TABLET ORAL at 17:18

## 2025-05-12 RX ADMIN — MORPHINE SULFATE 4 MG: 4 INJECTION, SOLUTION INTRAMUSCULAR; INTRAVENOUS at 14:46

## 2025-05-12 RX ADMIN — ONDANSETRON 4 MG: 2 INJECTION INTRAMUSCULAR; INTRAVENOUS at 14:46

## 2025-05-12 RX ADMIN — KETOROLAC TROMETHAMINE 15 MG: 15 INJECTION, SOLUTION INTRAMUSCULAR; INTRAVENOUS at 14:46

## 2025-05-12 RX ADMIN — SODIUM CHLORIDE 1000 ML: 9 INJECTION, SOLUTION INTRAVENOUS at 14:46

## 2025-05-12 NOTE — ED PROVIDER NOTES
Subjective   History of Present Illness  Pt is a 53 yo male presenting to ED with complaints of flank pain. PMHx significant for CAD (stents), HTN, HLD, Kidney stones, Anxiety and Arthritis. Pt reports intermittent left flank pain for 2 days but worsening. He describes intense, sharp pain that wraps around flank to left abdomen. He has had nausea but denies vomiting. He reports some difficulty urinating but no obvious blood. He tried going to urologist this morning and was sent to ED. He took a Norco earlier today that was left over from dental procedure without relief. He denies fever, cough, CP or SOB. He uses ETOH occasionally and denies tobacco use.     History provided by:  Patient, medical records and spouse      Review of Systems   Constitutional:  Negative for fever.   Respiratory:  Negative for cough and shortness of breath.    Cardiovascular:  Negative for chest pain.   Gastrointestinal:  Positive for abdominal pain and nausea. Negative for diarrhea and vomiting.   Genitourinary:  Positive for difficulty urinating and flank pain. Negative for dysuria, frequency and hematuria.   Neurological:  Negative for dizziness, syncope and weakness.   Psychiatric/Behavioral:  Negative for confusion.        Past Medical History:   Diagnosis Date    HLD (hyperlipidemia)     Kidney stones     Leg pain     Right leg    Mood disorder     Osteoarthritis        No Known Allergies    Past Surgical History:   Procedure Laterality Date    CARDIAC CATHETERIZATION N/A 10/31/2023    Procedure: Left Heart Cath;  Surgeon: Addison Graff MD;  Location:  [facility];  Service: Cardiovascular;  Laterality: N/A;    CARDIAC CATHETERIZATION N/A 10/31/2023    Procedure: Stent CANDY coronary;  Surgeon: Addison Graff MD;  Location:  [facility];  Service: Cardiovascular;  Laterality: N/A;    CHOLECYSTECTOMY  2015    VASECTOMY  2002       Family History   Problem Relation Age of Onset    Cancer Mother      Heart disease Father     Heart attack Father     Heart disease Brother     Heart attack Brother     Diabetes Other        Social History     Socioeconomic History    Marital status:    Tobacco Use    Smoking status: Never    Smokeless tobacco: Never   Vaping Use    Vaping status: Never Used   Substance and Sexual Activity    Alcohol use: Yes     Comment: Rarely    Drug use: Never    Sexual activity: Defer           Objective   Physical Exam  Vitals and nursing note reviewed.   Constitutional:       Appearance: He is well-developed.   HENT:      Head: Atraumatic.      Nose: Nose normal.   Eyes:      General: Lids are normal.      Conjunctiva/sclera: Conjunctivae normal.      Pupils: Pupils are equal, round, and reactive to light.   Cardiovascular:      Rate and Rhythm: Normal rate and regular rhythm.      Heart sounds: Normal heart sounds.   Pulmonary:      Effort: Pulmonary effort is normal.      Breath sounds: Normal breath sounds.   Abdominal:      General: There is no distension.      Palpations: Abdomen is soft.      Tenderness: There is abdominal tenderness in the left lower quadrant. There is left CVA tenderness. There is no guarding or rebound.   Musculoskeletal:         General: No tenderness or deformity. Normal range of motion.      Cervical back: Normal range of motion and neck supple.   Skin:     General: Skin is warm and dry.   Neurological:      Mental Status: He is alert and oriented to person, place, and time.      Sensory: No sensory deficit.   Psychiatric:         Behavior: Behavior normal.         Procedures           ED Course  ED Course as of 05/12/25 1949   Mon May 12, 2025   1426  I personally reviewed and interpreted labs results and went over with patient.   I personally reviewed and interpreted vitals.   [RT]   1450 Comprehensive Metabolic Panel(!)  Glucose 109, Cr 1.84, BUN 15, AG 10  Noted TABITHA with most recent prior Cr 0.97 about a year ago. Pt given fluids [RT]   1451 CBC &  Differential(!)  WBC 11, stable H and H [RT]   1455 Lactate: 0.8 [RT]   1530 Urinalysis With Microscopic If Indicated (No Culture) - Urine, Clean Catch  WNL and no signs of infection  [RT]   1546 I personally and independently reviewed CT abd/pelvis and agree with the radiology interpretation specifically 6mm left proximal ureteral stone with hydronephrosis.  [RT]   1600 Discussed patient with Dr. Helms who is agreeable with outpatient f/u with Urology and PCP.  [RT]   1610 RE-examined patient several times in ED and discussed results and tx plan including for outpatient f/u with Urology regarding stone and close f/u with PCP for recheck of renal function. He will push fluids. Sent home on short course of Percocet, Flomax and Zofran. He will return to ED if new / worse sx [RT]      ED Course User Index  [RT] Desire Morel, PA        Recent Results (from the past 24 hours)   Comprehensive Metabolic Panel    Collection Time: 05/12/25  2:38 PM    Specimen: Blood   Result Value Ref Range    Glucose 109 (H) 65 - 99 mg/dL    BUN 15 6 - 20 mg/dL    Creatinine 1.84 (H) 0.76 - 1.27 mg/dL    Sodium 139 136 - 145 mmol/L    Potassium 4.4 3.5 - 5.2 mmol/L    Chloride 105 98 - 107 mmol/L    CO2 24.0 22.0 - 29.0 mmol/L    Calcium 9.0 8.6 - 10.5 mg/dL    Total Protein 7.0 6.0 - 8.5 g/dL    Albumin 4.2 3.5 - 5.2 g/dL    ALT (SGPT) 23 1 - 41 U/L    AST (SGOT) 18 1 - 40 U/L    Alkaline Phosphatase 61 39 - 117 U/L    Total Bilirubin 0.4 0.0 - 1.2 mg/dL    Globulin 2.8 gm/dL    A/G Ratio 1.5 g/dL    BUN/Creatinine Ratio 8.2 7.0 - 25.0    Anion Gap 10.0 5.0 - 15.0 mmol/L    eGFR 42.5 (L) >60.0 mL/min/1.73   Lipase    Collection Time: 05/12/25  2:38 PM    Specimen: Blood   Result Value Ref Range    Lipase 37 13 - 60 U/L   Lactic Acid, Plasma    Collection Time: 05/12/25  2:38 PM    Specimen: Blood   Result Value Ref Range    Lactate 0.8 0.5 - 2.0 mmol/L   Green Top (Gel)    Collection Time: 05/12/25  2:38 PM   Result Value Ref Range     Extra Tube Hold for add-ons.    Lavender Top    Collection Time: 05/12/25  2:38 PM   Result Value Ref Range    Extra Tube hold for add-on    Gold Top - SST    Collection Time: 05/12/25  2:38 PM   Result Value Ref Range    Extra Tube Hold for add-ons.    Gray Top    Collection Time: 05/12/25  2:38 PM   Result Value Ref Range    Extra Tube Hold for add-ons.    Light Blue Top    Collection Time: 05/12/25  2:38 PM   Result Value Ref Range    Extra Tube Hold for add-ons.    CBC Auto Differential    Collection Time: 05/12/25  2:38 PM    Specimen: Blood   Result Value Ref Range    WBC 11.90 (H) 3.40 - 10.80 10*3/mm3    RBC 4.80 4.14 - 5.80 10*6/mm3    Hemoglobin 14.0 13.0 - 17.7 g/dL    Hematocrit 42.6 37.5 - 51.0 %    MCV 88.8 79.0 - 97.0 fL    MCH 29.2 26.6 - 33.0 pg    MCHC 32.9 31.5 - 35.7 g/dL    RDW 14.1 12.3 - 15.4 %    RDW-SD 46.0 37.0 - 54.0 fl    MPV 11.4 6.0 - 12.0 fL    Platelets 158 140 - 450 10*3/mm3    Neutrophil % 76.4 (H) 42.7 - 76.0 %    Lymphocyte % 10.6 (L) 19.6 - 45.3 %    Monocyte % 11.9 5.0 - 12.0 %    Eosinophil % 0.5 0.3 - 6.2 %    Basophil % 0.3 0.0 - 1.5 %    Immature Grans % 0.3 0.0 - 0.5 %    Neutrophils, Absolute 9.10 (H) 1.70 - 7.00 10*3/mm3    Lymphocytes, Absolute 1.26 0.70 - 3.10 10*3/mm3    Monocytes, Absolute 1.42 (H) 0.10 - 0.90 10*3/mm3    Eosinophils, Absolute 0.06 0.00 - 0.40 10*3/mm3    Basophils, Absolute 0.03 0.00 - 0.20 10*3/mm3    Immature Grans, Absolute 0.03 0.00 - 0.05 10*3/mm3    nRBC 0.0 0.0 - 0.2 /100 WBC   Urinalysis With Microscopic If Indicated (No Culture) - Urine, Clean Catch    Collection Time: 05/12/25  3:24 PM    Specimen: Urine, Clean Catch   Result Value Ref Range    Color, UA Yellow Yellow, Straw    Appearance, UA Clear Clear    pH, UA 5.5 5.0 - 8.0    Specific Gravity, UA 1.016 1.001 - 1.030    Glucose, UA Negative Negative    Ketones, UA Negative Negative    Bilirubin, UA Negative Negative    Blood, UA Negative Negative    Protein, UA Negative Negative     Leuk Esterase, UA Negative Negative    Nitrite, UA Negative Negative    Urobilinogen, UA 0.2 E.U./dL 0.2 - 1.0 E.U./dL     Note: In addition to lab results from this visit, the labs listed above may include labs taken at another facility or during a different encounter within the last 24 hours. Please correlate lab times with ED admission and discharge times for further clarification of the services performed during this visit.    CT Abdomen Pelvis Without Contrast   Final Result   0.6 cm left proximal ureteric calculus with mild left-sided hydronephrosis. Nonobstructing left renal calculi.                  Electronically Signed: Tree Quintero MD     5/12/2025 3:38 PM EDT     Workstation ID: [id number]        Vitals:    05/12/25 1500 05/12/25 1530 05/12/25 1600 05/12/25 1630   BP:  103/64 98/63 115/79   BP Location:       Patient Position:       Pulse: 81 80 80 81   Resp:       Temp:       TempSrc:       SpO2: 96% 95% 94% 98%   Weight:       Height:         Medications   ondansetron (ZOFRAN) injection 4 mg (4 mg Intravenous Given 5/12/25 1446)   ketorolac (TORADOL) injection 15 mg (15 mg Intravenous Given 5/12/25 1446)   Morphine sulfate (PF) injection 4 mg (4 mg Intravenous Given 5/12/25 1446)   sodium chloride 0.9 % bolus 1,000 mL (0 mL Intravenous Stopped 5/12/25 1616)   oxyCODONE-acetaminophen (PERCOCET) 7.5-325 MG per tablet 1 tablet (1 tablet Oral Given 5/12/25 1718)     ECG/EMG Results (last 24 hours)       ** No results found for the last 24 hours. **          No orders to display       DISCHARGE    Patient discharged in stable condition.    Reviewed implications of results, diagnosis, meds, responsibility to follow up, warning signs and symptoms of possible worsening, potential complications and reasons to return to ER.    Patient/Family voiced understanding of above instructions.    Discussed plan for discharge, as there is no emergent indication for admission.  Pt/family is agreeable and understands need for  follow up and possible repeat testing.  Pt/family is aware that discharge does not mean that nothing is wrong but that it indicates no emergency is currently present that requires admission and they must continue care with follow-up as given below or with a physician of their choice.     FOLLOW-UP  Jose Alfredo Rodriguez MD  [address] [address] [facility]  [address]  [place] [address]  [phone]    Schedule an appointment as soon as possible for a visit   Recheck renal function    Kirill Brizuela MD  [address] [place] [address]  [phone]    Schedule an appointment as soon as possible for a visit       [facility] EMERGENCY DEPARTMENT  [address] [address] [phone]    If symptoms worsen         Medication List        New Prescriptions      naloxone 4 MG/0.1ML nasal spray  Commonly known as: NARCAN  Call 911. Don't prime. [place] in 1 nostril for overdose. Repeat in 2-3 minutes in other nostril if no or minimal breathing/responsiveness.     ondansetron ODT 4 MG disintegrating tablet  Commonly known as: ZOFRAN-ODT  Place 1 tablet on the tongue Every 6 (Six) Hours As Needed for Nausea or Vomiting for up to 15 doses.     oxyCODONE-acetaminophen 7.5-325 MG per tablet  Commonly known as: PERCOCET  Take 1 tablet by mouth Every 6 (Six) Hours As Needed for Moderate Pain for up to 15 doses.     tamsulosin 0.4 MG capsule 24 hr capsule  Commonly known as: FLOMAX  Take 1 capsule by mouth Daily.            Stop      sulfamethoxazole-trimethoprim 800-160 MG per tablet  Commonly known as: Bactrim DS               Where to Get Your Medications        These medications were sent to [facility] [id number] - [place], [place] - [address] - [phone]  - [phone]   [address], [facility] [address]      Phone: [phone]   naloxone 4 MG/0.1ML nasal spray  ondansetron ODT 4 MG disintegrating tablet  oxyCODONE-acetaminophen  7.5-325 MG per tablet  tamsulosin 0.4 MG capsule 24 hr capsule                                              ADRY reviewed by Seth Helms MD       Medical Decision Making  Pt is a 55 yo male presenting to ED with complaints of flank pain. I had a discussion with the patient / family regarding ED course, diagnosis, diagnostic results and treatment plan including medications and admission / discharge. Discussed if new or worse symptoms / concerns to return to ED for further evaluation. Discussed need for close follow up with PCP / specialists    DDx  Kidney stone, Diverticulitis, UTI, TABITHA, Dehydration     Problems Addressed:  TABITHA (acute kidney injury): complicated acute illness or injury  Left ureteral stone: complicated acute illness or injury  Nausea: complicated acute illness or injury  Renal colic on left side: complicated acute illness or injury    Amount and/or Complexity of Data Reviewed  Independent Historian: spouse  External Data Reviewed: notes.     Details: Reviewed previous non ED visits including prior labs, imaging, available notes, medications, allergies and surgical hx.     Labs: ordered. Decision-making details documented in ED Course.  Radiology: ordered and independent interpretation performed. Decision-making details documented in ED Course.    Risk  Prescription drug management.        Final diagnoses:   Left ureteral stone   TABITHA (acute kidney injury)   Renal colic on left side   Nausea       ED Disposition  ED Disposition       ED Disposition   Discharge    Condition   Stable    Comment   --               Jose Alfredo Rodriguez MD  [address] [address] [address] [address]  [place] [id number] [phone]    Schedule an appointment as soon as possible for a visit   Recheck renal function    Kirill Brizuela MD  [address] [id number] [phone]    Schedule an appointment as soon as possible for a visit       [facility] EMERGENCY DEPARTMENT  [facility]  [facility] [address] [phone]    If symptoms worsen         Medication List        New Prescriptions      naloxone 4 MG/0.1ML nasal spray  Commonly known as: NARCAN  Call 911. Don't prime. [place] in 1 nostril for overdose. Repeat in 2-3 minutes in other nostril if no or minimal breathing/responsiveness.     ondansetron ODT 4 MG disintegrating tablet  Commonly known as: ZOFRAN-ODT  Place 1 tablet on the tongue Every 6 (Six) Hours As Needed for Nausea or Vomiting for up to 15 doses.     oxyCODONE-acetaminophen 7.5-325 MG per tablet  Commonly known as: PERCOCET  Take 1 tablet by mouth Every 6 (Six) Hours As Needed for Moderate Pain for up to 15 doses.     tamsulosin 0.4 MG capsule 24 hr capsule  Commonly known as: FLOMAX  Take 1 capsule by mouth Daily.            Stop      sulfamethoxazole-trimethoprim 800-160 MG per tablet  Commonly known as: Bactrim DS               Where to Get Your Medications        These medications were sent to [facility] [id number] - [place] [address] [facility] - [phone]  - [phone]   [address] [address]      Phone: [phone]   naloxone 4 MG/0.1ML nasal spray  ondansetron ODT 4 MG disintegrating tablet  oxyCODONE-acetaminophen 7.5-325 MG per tablet  tamsulosin 0.4 MG capsule 24 hr capsule            Desire Morel PA  05/12/25 1950

## 2025-05-13 ENCOUNTER — NURSE TRIAGE (OUTPATIENT)
Dept: CALL CENTER | Facility: HOSPITAL | Age: 55
End: 2025-05-13
Payer: COMMERCIAL

## 2025-05-13 NOTE — TELEPHONE ENCOUNTER
"Wife is concerned about her 's back and flank pain that goes into his testicle. He was in the ER yesterday and diagnosed with a kidney stone 6mm, but was told to follow up outpatient with urology. She does not think that he will make it that long with the amount of pain he is in. It is getting worse instead of better.   Triage completed and I advised her to take him back to the ED. She said they will go now.   Reason for Disposition   [1] SEVERE pain (e.g., excruciating, scale 8-10) AND [2] not improved after pain medicine    Additional Information   Negative: Shock suspected (e.g., cold/pale/clammy skin, too weak to stand, low BP, rapid pulse)   Negative: Sounds like a life-threatening emergency to the triager   Negative: [1] Back pain AND [2] NOT recently diagnosed with a kidney stone   Negative: [1] Flank pain (i.e., pain in the side, over the lower ribs or just below the ribs) AND [2] NOT recently diagnosed with a kidney stone   Negative: [1] Unable to urinate (or only a few drops) > 4 hours AND [2] bladder feels very full (e.g., palpable bladder or strong urge to urinate)    Answer Assessment - Initial Assessment Questions  1. MAIN CONCERN OR SYMPTOM:  \"What is your main concern right now?\" \"What question do you have?\" \"What's the main symptom you're worried about?\" (e.g., blood in urine, flank pain)      Back left pain that wraps around and pain in his left testicle   Back of his neck   2. ONSET: \"When did the  *No Answer*  start?\"      Saturday evening   3. BETTER-SAME-WORSE: \"Are you getting better, staying the same, or getting worse compared to how you felt at your last visit to the doctor (most recent medical visit)?\"      Worse   4. VISIT DATE: \"When were you seen?\" (Date)      Last night   5. VISIT DOCTOR: \"What is the name of the doctor (or NP/PA) taking care of you now?\"      ED DR Helms   6. VISIT DIAGNOSIS:  \"What was the main symptom or problem that you were seen for?\" \"Were you given a " "diagnosis?\"       6mm left kidney stone   7. TREATMENT: \"Did you have any treatment for your kidney stone?\" (e.g., none, doctor exam, lithotripsy, medicines, stent) If Yes, ask: \"When did you have this treatment?\"      Medications and urology follow up   8. NEXT APPOINTMENT: \"Do you have a follow-up appointment with your doctor?\"      Tomorrow at 9 am   9. PAIN: \"Is there any pain?\" If Yes, ask: \"How bad is it?\"  (Scale 0-10; or mild, moderate, severe)     - NONE (0): no pain     - MILD (1-3): doesn't interfere with normal activities      - MODERATE (4-7): interferes with normal activities or awakens from sleep      - SEVERE (8-10): excruciating pain, unable to do any normal activities      11/10   10. FEVER: \"Do you have a fever?\" If Yes, ask: \"What is it, how was it measured, and when did it start?\"        No   11. OTHER SYMPTOMS: \"Do you have any other symptoms?\" (e.g., abdomen pain, blood in urine, vomiting)         Nausea and now it is burning when he urinates, very slow- slower than yesterday   12. PREGNANCY: \"Is there any chance you are pregnant?\" \"When was your last menstrual period?\"        NA    Protocols used: Kidney Stone Follow-up Call-ADULT-    "

## (undated) DEVICE — GLIDESHEATH BASIC HYDROPHILIC COATED INTRODUCER SHEATH: Brand: GLIDESHEATH

## (undated) DEVICE — ADULT, W/LG. BACK PAD, RADIOTRANSPARENT ELEMENT AND LEAD WIRE COMPATIBLE W/: Brand: DEFIBRILLATION ELECTRODES

## (undated) DEVICE — PK CATH CARD 10

## (undated) DEVICE — TREK CORONARY DILATATION CATHETER 3.0 MM X 12 MM / RAPID-EXCHANGE: Brand: TREK

## (undated) DEVICE — CATH DIAG EXPO M/ PK 6FR FL4/FR4 PIG 3PK

## (undated) DEVICE — CATH DIAG EXPO .056 FL3.5 6F 100CM

## (undated) DEVICE — MODEL BT2000 P/N 700287-012KIT CONTENTS: MANIFOLD WITH SALINE AND CONTRAST PORTS, SALINE TUBING WITH SPIKE AND HAND SYRINGE, TRANSDUCER: Brand: BT2000 AUTOMATED MANIFOLD KIT

## (undated) DEVICE — HI-TORQUE VERSATURN F GUIDE WIRE FULLY COATED .014 STRAIGHT TIP 190 CM: Brand: HI-TORQUE VERSATURN

## (undated) DEVICE — GUIDE CATHETER: Brand: MACH1™

## (undated) DEVICE — DEV INFL MONARCH 25W

## (undated) DEVICE — GW PERIPH GUIDERIGHT STD/EXCHNG/J/TIP SS 0.035IN 5X260CM

## (undated) DEVICE — MODEL AT P65, P/N 701554-001KIT CONTENTS: HAND CONTROLLER, 3-WAY HIGH-PRESSURE STOPCOCK WITH ROTATING END AND PREMIUM HIGH-PRESSURE TUBING: Brand: ANGIOTOUCH® KIT

## (undated) DEVICE — DEV COMPR RADL PRELUDESYNCEZ 30ML 32CM